# Patient Record
Sex: FEMALE | Race: WHITE | NOT HISPANIC OR LATINO | Employment: OTHER | URBAN - METROPOLITAN AREA
[De-identification: names, ages, dates, MRNs, and addresses within clinical notes are randomized per-mention and may not be internally consistent; named-entity substitution may affect disease eponyms.]

---

## 2023-07-06 ENCOUNTER — HOSPITAL ENCOUNTER (OUTPATIENT)
Facility: HOSPITAL | Age: 48
Discharge: HOME | End: 2023-07-09
Attending: EMERGENCY MEDICINE | Admitting: HOSPITALIST
Payer: COMMERCIAL

## 2023-07-06 DIAGNOSIS — D25.9 UTERINE LEIOMYOMA, UNSPECIFIED LOCATION: ICD-10-CM

## 2023-07-06 DIAGNOSIS — R10.2 PELVIC PAIN: ICD-10-CM

## 2023-07-06 DIAGNOSIS — N10 ACUTE PYELONEPHRITIS: Primary | ICD-10-CM

## 2023-07-06 PROBLEM — N83.209 OVARIAN CYST: Status: ACTIVE | Noted: 2023-07-06

## 2023-07-06 PROBLEM — Z79.4 TYPE 2 DIABETES MELLITUS, WITH LONG-TERM CURRENT USE OF INSULIN: Status: ACTIVE | Noted: 2023-07-06

## 2023-07-06 PROBLEM — N12 PYELONEPHRITIS: Status: ACTIVE | Noted: 2023-07-06

## 2023-07-06 PROBLEM — E11.9 TYPE 2 DIABETES MELLITUS, WITH LONG-TERM CURRENT USE OF INSULIN: Status: ACTIVE | Noted: 2023-07-06

## 2023-07-06 LAB
ALBUMIN SERPL BCP-MCNC: 3.6 G/DL (ref 3.5–5.2)
ALP SERPL-CCNC: 88 U/L (ref 55–135)
ALT SERPL W/O P-5'-P-CCNC: 26 U/L (ref 10–44)
ANION GAP SERPL CALC-SCNC: 8 MMOL/L (ref 8–16)
AST SERPL-CCNC: 17 U/L (ref 10–40)
BACTERIA #/AREA URNS HPF: ABNORMAL /HPF
BASOPHILS # BLD AUTO: 0.04 K/UL (ref 0–0.2)
BASOPHILS NFR BLD: 0.8 % (ref 0–1.9)
BILIRUB SERPL-MCNC: 0.6 MG/DL (ref 0.1–1)
BILIRUB UR QL STRIP: NEGATIVE
BUN SERPL-MCNC: 17 MG/DL (ref 6–20)
CALCIUM SERPL-MCNC: 9.8 MG/DL (ref 8.7–10.5)
CHLORIDE SERPL-SCNC: 99 MMOL/L (ref 95–110)
CLARITY UR: ABNORMAL
CO2 SERPL-SCNC: 28 MMOL/L (ref 23–29)
COLOR UR: ABNORMAL
CREAT SERPL-MCNC: 0.8 MG/DL (ref 0.5–1.4)
DIFFERENTIAL METHOD: NORMAL
EOSINOPHIL # BLD AUTO: 0.1 K/UL (ref 0–0.5)
EOSINOPHIL NFR BLD: 2.6 % (ref 0–8)
EPITH CASTS #/AREA URNS LPF: 0 /LPF
ERYTHROCYTE [DISTWIDTH] IN BLOOD BY AUTOMATED COUNT: 12.7 % (ref 11.5–14.5)
EST. GFR  (NO RACE VARIABLE): >60 ML/MIN/1.73 M^2
GLUCOSE SERPL-MCNC: 264 MG/DL (ref 70–110)
GLUCOSE UR QL STRIP: ABNORMAL
HCT VFR BLD AUTO: 46.8 % (ref 37–48.5)
HGB BLD-MCNC: 15.5 G/DL (ref 12–16)
HGB UR QL STRIP: ABNORMAL
HYALINE CASTS #/AREA URNS LPF: 0 /LPF
IMM GRANULOCYTES # BLD AUTO: 0.02 K/UL (ref 0–0.04)
IMM GRANULOCYTES NFR BLD AUTO: 0.4 % (ref 0–0.5)
KETONES UR QL STRIP: NEGATIVE
LACTATE SERPL-SCNC: 1.5 MMOL/L (ref 0.5–2.2)
LEUKOCYTE ESTERASE UR QL STRIP: NEGATIVE
LIPASE SERPL-CCNC: 28 U/L (ref 4–60)
LYMPHOCYTES # BLD AUTO: 1.8 K/UL (ref 1–4.8)
LYMPHOCYTES NFR BLD: 36.7 % (ref 18–48)
MCH RBC QN AUTO: 28.9 PG (ref 27–31)
MCHC RBC AUTO-ENTMCNC: 33.1 G/DL (ref 32–36)
MCV RBC AUTO: 87 FL (ref 82–98)
MICROSCOPIC COMMENT: ABNORMAL
MONOCYTES # BLD AUTO: 0.4 K/UL (ref 0.3–1)
MONOCYTES NFR BLD: 8 % (ref 4–15)
NEUTROPHILS # BLD AUTO: 2.6 K/UL (ref 1.8–7.7)
NEUTROPHILS NFR BLD: 51.5 % (ref 38–73)
NITRITE UR QL STRIP: NEGATIVE
NRBC BLD-RTO: 0 /100 WBC
PH UR STRIP: 5 [PH] (ref 5–8)
PLATELET # BLD AUTO: 166 K/UL (ref 150–450)
PMV BLD AUTO: 10.4 FL (ref 9.2–12.9)
POCT GLUCOSE: 76 MG/DL (ref 70–110)
POTASSIUM SERPL-SCNC: 4.6 MMOL/L (ref 3.5–5.1)
PROT SERPL-MCNC: 8 G/DL (ref 6–8.4)
PROT UR QL STRIP: ABNORMAL
RBC # BLD AUTO: 5.36 M/UL (ref 4–5.4)
RBC #/AREA URNS HPF: >100 /HPF (ref 0–4)
SODIUM SERPL-SCNC: 135 MMOL/L (ref 136–145)
SP GR UR STRIP: 1.01 (ref 1–1.03)
URN SPEC COLLECT METH UR: ABNORMAL
UROBILINOGEN UR STRIP-ACNC: NEGATIVE EU/DL
WBC # BLD AUTO: 4.98 K/UL (ref 3.9–12.7)
WBC #/AREA URNS HPF: 0 /HPF (ref 0–5)
YEAST URNS QL MICRO: ABNORMAL

## 2023-07-06 PROCEDURE — 25000003 PHARM REV CODE 250: Performed by: EMERGENCY MEDICINE

## 2023-07-06 PROCEDURE — 85025 COMPLETE CBC W/AUTO DIFF WBC: CPT | Performed by: PHYSICIAN ASSISTANT

## 2023-07-06 PROCEDURE — 11000001 HC ACUTE MED/SURG PRIVATE ROOM

## 2023-07-06 PROCEDURE — 87186 SC STD MICRODIL/AGAR DIL: CPT | Mod: 59 | Performed by: NURSE PRACTITIONER

## 2023-07-06 PROCEDURE — 87077 CULTURE AEROBIC IDENTIFY: CPT | Mod: 59 | Performed by: NURSE PRACTITIONER

## 2023-07-06 PROCEDURE — 36415 COLL VENOUS BLD VENIPUNCTURE: CPT | Performed by: EMERGENCY MEDICINE

## 2023-07-06 PROCEDURE — 80053 COMPREHEN METABOLIC PANEL: CPT | Performed by: PHYSICIAN ASSISTANT

## 2023-07-06 PROCEDURE — 63600175 PHARM REV CODE 636 W HCPCS: Performed by: EMERGENCY MEDICINE

## 2023-07-06 PROCEDURE — G0378 HOSPITAL OBSERVATION PER HR: HCPCS

## 2023-07-06 PROCEDURE — 36415 COLL VENOUS BLD VENIPUNCTURE: CPT | Performed by: PHYSICIAN ASSISTANT

## 2023-07-06 PROCEDURE — 83690 ASSAY OF LIPASE: CPT | Performed by: PHYSICIAN ASSISTANT

## 2023-07-06 PROCEDURE — 87086 URINE CULTURE/COLONY COUNT: CPT | Performed by: NURSE PRACTITIONER

## 2023-07-06 PROCEDURE — 25000003 PHARM REV CODE 250: Performed by: NURSE PRACTITIONER

## 2023-07-06 PROCEDURE — 96375 TX/PRO/DX INJ NEW DRUG ADDON: CPT

## 2023-07-06 PROCEDURE — 87040 BLOOD CULTURE FOR BACTERIA: CPT | Performed by: EMERGENCY MEDICINE

## 2023-07-06 PROCEDURE — 96361 HYDRATE IV INFUSION ADD-ON: CPT

## 2023-07-06 PROCEDURE — 99285 EMERGENCY DEPT VISIT HI MDM: CPT | Mod: 25

## 2023-07-06 PROCEDURE — 81000 URINALYSIS NONAUTO W/SCOPE: CPT | Performed by: PHYSICIAN ASSISTANT

## 2023-07-06 PROCEDURE — 96365 THER/PROPH/DIAG IV INF INIT: CPT

## 2023-07-06 PROCEDURE — 83605 ASSAY OF LACTIC ACID: CPT | Performed by: EMERGENCY MEDICINE

## 2023-07-06 RX ORDER — IPRATROPIUM BROMIDE AND ALBUTEROL SULFATE 2.5; .5 MG/3ML; MG/3ML
3 SOLUTION RESPIRATORY (INHALATION) EVERY 4 HOURS PRN
Status: DISCONTINUED | OUTPATIENT
Start: 2023-07-06 | End: 2023-07-09 | Stop reason: HOSPADM

## 2023-07-06 RX ORDER — SODIUM CHLORIDE 9 MG/ML
INJECTION, SOLUTION INTRAVENOUS CONTINUOUS
Status: DISCONTINUED | OUTPATIENT
Start: 2023-07-06 | End: 2023-07-07

## 2023-07-06 RX ORDER — INSULIN ASPART 100 [IU]/ML
1-10 INJECTION, SOLUTION INTRAVENOUS; SUBCUTANEOUS
Status: DISCONTINUED | OUTPATIENT
Start: 2023-07-06 | End: 2023-07-09 | Stop reason: HOSPADM

## 2023-07-06 RX ORDER — LANOLIN ALCOHOL/MO/W.PET/CERES
800 CREAM (GRAM) TOPICAL
Status: DISCONTINUED | OUTPATIENT
Start: 2023-07-06 | End: 2023-07-09 | Stop reason: HOSPADM

## 2023-07-06 RX ORDER — GLUCAGON 1 MG
1 KIT INJECTION
Status: DISCONTINUED | OUTPATIENT
Start: 2023-07-06 | End: 2023-07-09 | Stop reason: HOSPADM

## 2023-07-06 RX ORDER — MAG HYDROX/ALUMINUM HYD/SIMETH 200-200-20
30 SUSPENSION, ORAL (FINAL DOSE FORM) ORAL 4 TIMES DAILY PRN
Status: DISCONTINUED | OUTPATIENT
Start: 2023-07-06 | End: 2023-07-09 | Stop reason: HOSPADM

## 2023-07-06 RX ORDER — ACETAMINOPHEN 325 MG/1
650 TABLET ORAL EVERY 4 HOURS PRN
Status: DISCONTINUED | OUTPATIENT
Start: 2023-07-06 | End: 2023-07-09 | Stop reason: HOSPADM

## 2023-07-06 RX ORDER — SODIUM CHLORIDE 0.9 % (FLUSH) 0.9 %
10 SYRINGE (ML) INJECTION EVERY 8 HOURS PRN
Status: DISCONTINUED | OUTPATIENT
Start: 2023-07-06 | End: 2023-07-09 | Stop reason: HOSPADM

## 2023-07-06 RX ORDER — IBUPROFEN 200 MG
24 TABLET ORAL
Status: DISCONTINUED | OUTPATIENT
Start: 2023-07-06 | End: 2023-07-09 | Stop reason: HOSPADM

## 2023-07-06 RX ORDER — SODIUM,POTASSIUM PHOSPHATES 280-250MG
2 POWDER IN PACKET (EA) ORAL
Status: DISCONTINUED | OUTPATIENT
Start: 2023-07-06 | End: 2023-07-09 | Stop reason: HOSPADM

## 2023-07-06 RX ORDER — ACETAMINOPHEN 325 MG/1
650 TABLET ORAL EVERY 6 HOURS PRN
Status: DISCONTINUED | OUTPATIENT
Start: 2023-07-06 | End: 2023-07-09 | Stop reason: HOSPADM

## 2023-07-06 RX ORDER — ONDANSETRON 2 MG/ML
4 INJECTION INTRAMUSCULAR; INTRAVENOUS EVERY 8 HOURS PRN
Status: DISCONTINUED | OUTPATIENT
Start: 2023-07-06 | End: 2023-07-09 | Stop reason: HOSPADM

## 2023-07-06 RX ORDER — IBUPROFEN 200 MG
16 TABLET ORAL
Status: DISCONTINUED | OUTPATIENT
Start: 2023-07-06 | End: 2023-07-09 | Stop reason: HOSPADM

## 2023-07-06 RX ORDER — NALOXONE HCL 0.4 MG/ML
0.02 VIAL (ML) INJECTION
Status: DISCONTINUED | OUTPATIENT
Start: 2023-07-06 | End: 2023-07-09 | Stop reason: HOSPADM

## 2023-07-06 RX ORDER — TALC
9 POWDER (GRAM) TOPICAL NIGHTLY PRN
Status: DISCONTINUED | OUTPATIENT
Start: 2023-07-06 | End: 2023-07-09 | Stop reason: HOSPADM

## 2023-07-06 RX ORDER — ONDANSETRON 2 MG/ML
4 INJECTION INTRAMUSCULAR; INTRAVENOUS
Status: COMPLETED | OUTPATIENT
Start: 2023-07-06 | End: 2023-07-06

## 2023-07-06 RX ORDER — MORPHINE SULFATE 4 MG/ML
4 INJECTION, SOLUTION INTRAMUSCULAR; INTRAVENOUS EVERY 4 HOURS PRN
Status: DISCONTINUED | OUTPATIENT
Start: 2023-07-06 | End: 2023-07-09 | Stop reason: HOSPADM

## 2023-07-06 RX ORDER — SIMETHICONE 80 MG
1 TABLET,CHEWABLE ORAL 4 TIMES DAILY PRN
Status: DISCONTINUED | OUTPATIENT
Start: 2023-07-06 | End: 2023-07-09 | Stop reason: HOSPADM

## 2023-07-06 RX ORDER — MORPHINE SULFATE 4 MG/ML
4 INJECTION, SOLUTION INTRAMUSCULAR; INTRAVENOUS
Status: COMPLETED | OUTPATIENT
Start: 2023-07-06 | End: 2023-07-06

## 2023-07-06 RX ADMIN — ONDANSETRON HYDROCHLORIDE 4 MG: 2 SOLUTION INTRAMUSCULAR; INTRAVENOUS at 04:07

## 2023-07-06 RX ADMIN — SODIUM CHLORIDE: 9 INJECTION, SOLUTION INTRAVENOUS at 08:07

## 2023-07-06 RX ADMIN — SODIUM CHLORIDE, POTASSIUM CHLORIDE, SODIUM LACTATE AND CALCIUM CHLORIDE 1000 ML: 600; 310; 30; 20 INJECTION, SOLUTION INTRAVENOUS at 04:07

## 2023-07-06 RX ADMIN — CEFTRIAXONE SODIUM 1 G: 1 INJECTION, POWDER, FOR SOLUTION INTRAMUSCULAR; INTRAVENOUS at 06:07

## 2023-07-06 RX ADMIN — MORPHINE SULFATE 4 MG: 4 INJECTION, SOLUTION INTRAMUSCULAR; INTRAVENOUS at 04:07

## 2023-07-06 NOTE — ED NOTES
Pt worried since she hadn't had a period since December and while obtaining a urine specimen she felt a gush and passed a clot and blood. M.D. is aware and at the bedside.

## 2023-07-06 NOTE — FIRST PROVIDER EVALUATION
" Emergency Department TeleTriage Encounter Note      CHIEF COMPLAINT    Chief Complaint   Patient presents with    Abdominal Pain     Lower abdominal pain and pressure, doesn't burn when she urinated but has pain after and back pain        VITAL SIGNS   Initial Vitals [07/06/23 1508]   BP Pulse Resp Temp SpO2   (!) 190/85 85 20 98.3 °F (36.8 °C) 97 %      MAP       --            ALLERGIES    Review of patient's allergies indicates:  No Known Allergies    PROVIDER TRIAGE NOTE  This is a teletriage evaluation of a 48 y.o. female presenting to the ED complaining of abdominal pain. Patient with lower abdominal pain and pressure for 5 days, but has worsened in the last 24 hours. She is currently taking ciprofloxacin for UTI. She had CT done 3 days ago which showed "all kinds of stuff." She has results on her phone. She reports nausea and vomiting.    Patient is alert and oriented. She speaks in complete sentences. She is sitting upright in the chair in no distress. She appears uncomfortable.    Initial orders will be placed and care will be transferred to an alternate provider when patient is roomed for a full evaluation. Any additional orders and the final disposition will be determined by that provider.         ORDERS  Labs Reviewed   CBC W/ AUTO DIFFERENTIAL   COMPREHENSIVE METABOLIC PANEL   LIPASE   URINALYSIS, REFLEX TO URINE CULTURE       ED Orders (720h ago, onward)      Start Ordered     Status Ordering Provider    07/06/23 1524 07/06/23 1523  Vital signs  Every 2 hours         Ordered DEBI BONILLA    07/06/23 1524 07/06/23 1523  Diet NPO  Diet effective now         Ordered DEBI BONILLA    07/06/23 1524 07/06/23 1523  Insert peripheral IV  Once         Ordered DEBI BONILLA    07/06/23 1524 07/06/23 1523  CBC W/ AUTO DIFFERENTIAL  STAT         Pending Collection DEBI BONILLA    07/06/23 1524 07/06/23 1523  Comp. Metabolic Panel  STAT         Pending Collection DEBI BONILLA    07/06/23 1524 07/06/23 " 1523  Lipase  STAT         Pending Collection DEBI BONILLA    07/06/23 1524 07/06/23 1523  Urinalysis, Reflex to Urine Culture Urine, Clean Catch  STAT         Ordered DEBI BONILLA              Virtual Visit Note: The provider triage portion of this emergency department evaluation and documentation was performed via e-volo, a HIPAA-compliant telemedicine application, in concert with a tele-presenter in the room. A face to face patient evaluation with one of my colleagues will occur once the patient is placed in an emergency department room.      DISCLAIMER: This note was prepared with [employer] voice recognition transcription software. Garbled syntax, mangled pronouns, and other bizarre constructions may be attributed to that software system.

## 2023-07-07 ENCOUNTER — TELEPHONE (OUTPATIENT)
Dept: OBSTETRICS AND GYNECOLOGY | Facility: CLINIC | Age: 48
End: 2023-07-07

## 2023-07-07 PROBLEM — E44.1 MILD MALNUTRITION: Status: ACTIVE | Noted: 2023-07-07

## 2023-07-07 LAB
ALBUMIN SERPL BCP-MCNC: 3 G/DL (ref 3.5–5.2)
ALP SERPL-CCNC: 74 U/L (ref 55–135)
ALT SERPL W/O P-5'-P-CCNC: 18 U/L (ref 10–44)
ANION GAP SERPL CALC-SCNC: 9 MMOL/L (ref 8–16)
AST SERPL-CCNC: 15 U/L (ref 10–40)
BASOPHILS # BLD AUTO: 0.04 K/UL (ref 0–0.2)
BASOPHILS NFR BLD: 0.7 % (ref 0–1.9)
BILIRUB SERPL-MCNC: 0.4 MG/DL (ref 0.1–1)
BUN SERPL-MCNC: 14 MG/DL (ref 6–20)
CALCIUM SERPL-MCNC: 8.8 MG/DL (ref 8.7–10.5)
CHLORIDE SERPL-SCNC: 102 MMOL/L (ref 95–110)
CO2 SERPL-SCNC: 27 MMOL/L (ref 23–29)
CREAT SERPL-MCNC: 0.8 MG/DL (ref 0.5–1.4)
DIFFERENTIAL METHOD: ABNORMAL
EOSINOPHIL # BLD AUTO: 0.2 K/UL (ref 0–0.5)
EOSINOPHIL NFR BLD: 2.8 % (ref 0–8)
ERYTHROCYTE [DISTWIDTH] IN BLOOD BY AUTOMATED COUNT: 12.8 % (ref 11.5–14.5)
EST. GFR  (NO RACE VARIABLE): >60 ML/MIN/1.73 M^2
GLUCOSE SERPL-MCNC: 150 MG/DL (ref 70–110)
HCT VFR BLD AUTO: 43 % (ref 37–48.5)
HGB BLD-MCNC: 14 G/DL (ref 12–16)
IMM GRANULOCYTES # BLD AUTO: 0.04 K/UL (ref 0–0.04)
IMM GRANULOCYTES NFR BLD AUTO: 0.7 % (ref 0–0.5)
LYMPHOCYTES # BLD AUTO: 2.6 K/UL (ref 1–4.8)
LYMPHOCYTES NFR BLD: 43.9 % (ref 18–48)
MAGNESIUM SERPL-MCNC: 1.7 MG/DL (ref 1.6–2.6)
MCH RBC QN AUTO: 28.5 PG (ref 27–31)
MCHC RBC AUTO-ENTMCNC: 32.6 G/DL (ref 32–36)
MCV RBC AUTO: 87 FL (ref 82–98)
MONOCYTES # BLD AUTO: 0.5 K/UL (ref 0.3–1)
MONOCYTES NFR BLD: 7.9 % (ref 4–15)
NEUTROPHILS # BLD AUTO: 2.6 K/UL (ref 1.8–7.7)
NEUTROPHILS NFR BLD: 44 % (ref 38–73)
NRBC BLD-RTO: 0 /100 WBC
PHOSPHATE SERPL-MCNC: 4.3 MG/DL (ref 2.7–4.5)
PLATELET # BLD AUTO: 145 K/UL (ref 150–450)
PMV BLD AUTO: 10.1 FL (ref 9.2–12.9)
POCT GLUCOSE: 157 MG/DL (ref 70–110)
POCT GLUCOSE: 158 MG/DL (ref 70–110)
POCT GLUCOSE: 284 MG/DL (ref 70–110)
POTASSIUM SERPL-SCNC: 4.4 MMOL/L (ref 3.5–5.1)
PROT SERPL-MCNC: 6.7 G/DL (ref 6–8.4)
RBC # BLD AUTO: 4.92 M/UL (ref 4–5.4)
SODIUM SERPL-SCNC: 138 MMOL/L (ref 136–145)
WBC # BLD AUTO: 5.97 K/UL (ref 3.9–12.7)

## 2023-07-07 PROCEDURE — 83735 ASSAY OF MAGNESIUM: CPT | Performed by: NURSE PRACTITIONER

## 2023-07-07 PROCEDURE — 36415 COLL VENOUS BLD VENIPUNCTURE: CPT | Performed by: NURSE PRACTITIONER

## 2023-07-07 PROCEDURE — 96375 TX/PRO/DX INJ NEW DRUG ADDON: CPT

## 2023-07-07 PROCEDURE — 96372 THER/PROPH/DIAG INJ SC/IM: CPT | Performed by: NURSE PRACTITIONER

## 2023-07-07 PROCEDURE — 96376 TX/PRO/DX INJ SAME DRUG ADON: CPT

## 2023-07-07 PROCEDURE — 99900035 HC TECH TIME PER 15 MIN (STAT)

## 2023-07-07 PROCEDURE — 80053 COMPREHEN METABOLIC PANEL: CPT | Performed by: NURSE PRACTITIONER

## 2023-07-07 PROCEDURE — 84100 ASSAY OF PHOSPHORUS: CPT | Performed by: NURSE PRACTITIONER

## 2023-07-07 PROCEDURE — 63600175 PHARM REV CODE 636 W HCPCS: Performed by: NURSE PRACTITIONER

## 2023-07-07 PROCEDURE — G0378 HOSPITAL OBSERVATION PER HR: HCPCS

## 2023-07-07 PROCEDURE — 94761 N-INVAS EAR/PLS OXIMETRY MLT: CPT

## 2023-07-07 PROCEDURE — 85025 COMPLETE CBC W/AUTO DIFF WBC: CPT | Performed by: NURSE PRACTITIONER

## 2023-07-07 PROCEDURE — 25000003 PHARM REV CODE 250: Performed by: NURSE PRACTITIONER

## 2023-07-07 RX ORDER — KETOROLAC TROMETHAMINE 30 MG/ML
15 INJECTION, SOLUTION INTRAMUSCULAR; INTRAVENOUS EVERY 6 HOURS
Status: DISCONTINUED | OUTPATIENT
Start: 2023-07-07 | End: 2023-07-09 | Stop reason: HOSPADM

## 2023-07-07 RX ADMIN — KETOROLAC TROMETHAMINE 15 MG: 30 INJECTION, SOLUTION INTRAMUSCULAR; INTRAVENOUS at 05:07

## 2023-07-07 RX ADMIN — INSULIN ASPART 2 UNITS: 100 INJECTION, SOLUTION INTRAVENOUS; SUBCUTANEOUS at 05:07

## 2023-07-07 RX ADMIN — INSULIN ASPART 3 UNITS: 100 INJECTION, SOLUTION INTRAVENOUS; SUBCUTANEOUS at 09:07

## 2023-07-07 RX ADMIN — CEFTRIAXONE 1 G: 1 INJECTION, POWDER, FOR SOLUTION INTRAMUSCULAR; INTRAVENOUS at 06:07

## 2023-07-07 RX ADMIN — CEFTRIAXONE 1 G: 1 INJECTION, POWDER, FOR SOLUTION INTRAMUSCULAR; INTRAVENOUS at 05:07

## 2023-07-07 RX ADMIN — ACETAMINOPHEN 650 MG: 325 TABLET ORAL at 12:07

## 2023-07-07 RX ADMIN — MORPHINE SULFATE 4 MG: 4 INJECTION INTRAVENOUS at 02:07

## 2023-07-07 RX ADMIN — KETOROLAC TROMETHAMINE 15 MG: 30 INJECTION, SOLUTION INTRAMUSCULAR; INTRAVENOUS at 11:07

## 2023-07-07 NOTE — SUBJECTIVE & OBJECTIVE
Interval History:  Patient seen and examined.  No acute events since admission.  Prefers to take nonnarcotic medications for her pain.  Discussed trialing Toradol.  Patient agreeable.  Awaiting evaluation by gyn.  Reports that she had to episodes of vaginal clotting today.    Review of Systems   Constitutional:  Positive for appetite change, fatigue and fever.   Genitourinary:  Positive for frequency and vaginal bleeding.        Suprapubic pressure   Musculoskeletal:  Positive for back pain.   All other systems reviewed and are negative.  Objective:     Vital Signs (Most Recent):  Temp: 97.3 °F (36.3 °C) (07/07/23 0723)  Pulse: 71 (07/07/23 0723)  Resp: 18 (07/07/23 0723)  BP: 112/61 (07/07/23 0723)  SpO2: 96 % (07/07/23 0741) Vital Signs (24h Range):  Temp:  [97 °F (36.1 °C)-98.9 °F (37.2 °C)] 97.3 °F (36.3 °C)  Pulse:  [71-85] 71  Resp:  [16-20] 18  SpO2:  [92 %-100 %] 96 %  BP: (112-190)/(61-85) 112/61     Weight: 85.2 kg (187 lb 13.3 oz)  Body mass index is 34.35 kg/m².    Intake/Output Summary (Last 24 hours) at 7/7/2023 1106  Last data filed at 7/7/2023 0513  Gross per 24 hour   Intake 1068.77 ml   Output 900 ml   Net 168.77 ml         Physical Exam  Constitutional:       Appearance: She is ill-appearing.   Cardiovascular:      Rate and Rhythm: Normal rate and regular rhythm.      Pulses: Normal pulses.      Heart sounds: Normal heart sounds.   Pulmonary:      Effort: Pulmonary effort is normal. No respiratory distress.      Breath sounds: Normal breath sounds.   Abdominal:      General: Bowel sounds are normal. There is no distension.      Palpations: Abdomen is soft.      Tenderness: There is abdominal tenderness (Suprapubic). There is no guarding.   Skin:     General: Skin is warm and dry.      Coloration: Skin is not pale.   Neurological:      Mental Status: She is alert.           Significant Labs: All pertinent labs within the past 24 hours have been reviewed.  CBC:   Recent Labs   Lab 07/06/23  4126  07/07/23  0356   WBC 4.98 5.97   HGB 15.5 14.0   HCT 46.8 43.0    145*     CMP:   Recent Labs   Lab 07/06/23  1546 07/07/23  0356   * 138   K 4.6 4.4   CL 99 102   CO2 28 27   * 150*   BUN 17 14   CREATININE 0.8 0.8   CALCIUM 9.8 8.8   PROT 8.0 6.7   ALBUMIN 3.6 3.0*   BILITOT 0.6 0.4   ALKPHOS 88 74   AST 17 15   ALT 26 18   ANIONGAP 8 9     Urine Studies:   Recent Labs   Lab 07/06/23  1737   COLORU Red*   APPEARANCEUA Cloudy*   PHUR 5.0   SPECGRAV 1.010   PROTEINUA 3+*   GLUCUA 3+*   KETONESU Negative   BILIRUBINUA Negative   OCCULTUA 3+*   NITRITE Negative   UROBILINOGEN Negative   LEUKOCYTESUR Negative   RBCUA >100*   WBCUA 0   BACTERIA Rare   HYALINECASTS 0       Significant Imaging: I have reviewed all pertinent imaging results/findings within the past 24 hours.

## 2023-07-07 NOTE — PLAN OF CARE
[facility] - Med/Surg  Initial Discharge Assessment       Primary Care Provider: Yuliet Holman NP    Admission Diagnosis: Acute pyelonephritis [N10]    Admission Date: 7/6/2023  Expected Discharge Date: 7/8/2023    Transition of Care Barriers: None    Payor: [employer] ACCOUNT / Plan: AMBETTER / Product Type: PPO /     Extended Emergency Contact Information  Primary Emergency Contact: Hasmukh Cameron  Mobile Phone: [phone]  Relation: Spouse  Preferred language: English   needed? No    Discharge Plan A: Home  Discharge Plan B: Home with family      [facility] - [place] - [address] [facility]  [address] [place] [address]  Phone: [phone] Fax: [phone]    W met with patient and patient's spouse Hasmukh Cameron at bedside to complete discharge planning assessment.  Patient alert and oriented xs 4.  Patient verified all demographic information on facesheet is correct.  Patient verified PCP is NIKUNJ Holman.  Patient verified primary health insurance is meevl.  Patient with NO home health or DME.  Patient with NO POA or Living Will.  Patient not on dialysis or medication coumadin.  Patient with no 30 day admission.  Patient with no financial issues at this time.  Patient family will provide transportation upon discharge from facility.  Patient independent with ADLs, live with spouse, drives self.      Initial Assessment (most recent)       Adult Discharge Assessment - 07/07/23 1312          Discharge Assessment    Assessment Type Discharge Planning Assessment     Confirmed/corrected address, phone number and insurance Yes     Confirmed Demographics Correct on Facesheet     Source of Information patient     Does patient/caregiver understand observation status Yes     Communicated KARINA with patient/caregiver Yes     People in Home spouse     Facility Arrived From: home     Do you expect to return to your current living situation? Yes     Do you have help at home  or someone to help you manage your care at home? Yes     Who are your caregiver(s) and their phone number(s)? spouse     Prior to hospitilization cognitive status: Alert/Oriented     Current cognitive status: Alert/Oriented     Equipment Currently Used at Home none     Readmission within 30 days? No     Patient currently being followed by outpatient case management? No     Do you currently have service(s) that help you manage your care at home? No     Do you take prescription medications? Yes     Do you have prescription coverage? Yes     Do you have any problems affording any of your prescribed medications? No     Is the patient taking medications as prescribed? yes     Who is going to help you get home at discharge? spouse     How do you get to doctors appointments? car, drives self     Are you on dialysis? No     Do you take coumadin? No     Discharge Plan A Home     Discharge Plan B Home with family     DME Needed Upon Discharge  none     Discharge Plan discussed with: Patient;Spouse/sig other     Transition of Care Barriers None        Physical Activity    On average, how many days per week do you engage in moderate to strenuous exercise (like a brisk walk)? Patient refused     On average, how many minutes do you engage in exercise at this level? Patient refused        Financial Resource Strain    How hard is it for you to pay for the very basics like food, housing, medical care, and heating? Patient refused        Housing Stability    In the last 12 months, was there a time when you were not able to pay the mortgage or rent on time? Patient refused     In the last 12 months, was there a time when you did not have a steady place to sleep or slept in a shelter (including now)? Patient refused        Transportation Needs    In the past 12 months, has lack of transportation kept you from medical appointments or from getting medications? Patient refused     In the past 12 months, has lack of transportation kept you  from meetings, work, or from getting things needed for daily living? Patient refused        Food Insecurity    Within the past 12 months, you worried that your food would run out before you got the money to buy more. Patient refused     Within the past 12 months, the food you bought just didn't last and you didn't have money to get more. Patient refused        Stress    Do you feel stress - tense, restless, nervous, or anxious, or unable to sleep at night because your mind is troubled all the time - these days? Patient refused        Social Connections    In a typical week, how many times do you talk on the phone with family, friends, or neighbors? Patient refused     How often do you get together with friends or relatives? Patient refused     How often do you attend Druze or Scientologist services? Patient refused     Do you belong to any clubs or organizations such as Druze groups, unions, fraternal or athletic groups, or school groups? Patient refused     How often do you attend meetings of the clubs or organizations you belong to? Patient refused     Are you , , , , never , or living with a partner?         Alcohol Use    Q1: How often do you have a drink containing alcohol? Patient refused     Q2: How many drinks containing alcohol do you have on a typical day when you are drinking? Patient refused     Q3: How often do you have six or more drinks on one occasion? Patient refused

## 2023-07-07 NOTE — PROGRESS NOTES
[facility] Medicine  Progress Note    Patient Name: Boo Cameron  MRN: [id number]  Patient Class: OP- Observation   Admission Date: 7/6/2023  Length of Stay: 1 days  Attending Physician: William Narayan MD  Primary Care Provider: No primary care provider on file.        Subjective:     Principal Problem:Pyelonephritis        HPI:  Baljeet Cameron is a 48 year female who presents emergency room for evaluation of fever, nausea, abdominal pain, and flank pain.  She reports the symptoms onset approximately 4 weeks ago.  Four weeks ago she was seen by primary care and diagnosed with urinary tract infection.  She was treated with amoxicillin.  A week later she still had symptoms returned to her PCP and was started on Macrobid.  Approximately 1 week later symptoms have not improved and patient returned to PCP and was started on Bactrim DS.  She then returned to PCP for worsening of symptoms where she had an outpatient CT ordered and was started on p.o. Cipro which she is taking as directed.  Outpatient CT demonstrated a large ovarian/bladder cyst some focal pyelonephritis.  She was seen once by OBGYN but has yet to receive any follow-up.  Patient reports she did not have a urine culture ordered.  Just prior to arrival patient reported going to the restroom to attempt to void when she noticed a large blood clot in her urine.  Previous medical history includes diabetes.  She reports her glucose has been stable.  She reports compliance with her diabetic medications.  CBC unremarkable.  CMP with glucose of 264 otherwise unremarkable.  Urinalysis with rare bacteria greater than 100 red blood cells.  Patient admitted to Hospital Medicine for treatment and management.  Patient will be started on IV fluids and given Rocephin.  Urine culture has been ordered.      Overview/Hospital Course:  No notes on file    Interval History:  Patient seen and examined.  No acute events since admission.  Prefers to take  nonnarcotic medications for her pain.  Discussed trialing Toradol.  Patient agreeable.  Awaiting evaluation by gyn.  Reports that she had to episodes of vaginal clotting today.    Review of Systems   Constitutional:  Positive for appetite change, fatigue and fever.   Genitourinary:  Positive for frequency and vaginal bleeding.        Suprapubic pressure   Musculoskeletal:  Positive for back pain.   All other systems reviewed and are negative.  Objective:     Vital Signs (Most Recent):  Temp: 97.3 °F (36.3 °C) (07/07/23 0723)  Pulse: 71 (07/07/23 0723)  Resp: 18 (07/07/23 0723)  BP: 112/61 (07/07/23 0723)  SpO2: 96 % (07/07/23 0741) Vital Signs (24h Range):  Temp:  [97 °F (36.1 °C)-98.9 °F (37.2 °C)] 97.3 °F (36.3 °C)  Pulse:  [71-85] 71  Resp:  [16-20] 18  SpO2:  [92 %-100 %] 96 %  BP: (112-190)/(61-85) 112/61     Weight: 85.2 kg (187 lb 13.3 oz)  Body mass index is 34.35 kg/m².    Intake/Output Summary (Last 24 hours) at 7/7/2023 1106  Last data filed at 7/7/2023 0513  Gross per 24 hour   Intake 1068.77 ml   Output 900 ml   Net 168.77 ml         Physical Exam  Constitutional:       Appearance: She is ill-appearing.   Cardiovascular:      Rate and Rhythm: Normal rate and regular rhythm.      Pulses: Normal pulses.      Heart sounds: Normal heart sounds.   Pulmonary:      Effort: Pulmonary effort is normal. No respiratory distress.      Breath sounds: Normal breath sounds.   Abdominal:      General: Bowel sounds are normal. There is no distension.      Palpations: Abdomen is soft.      Tenderness: There is abdominal tenderness (Suprapubic). There is no guarding.   Skin:     General: Skin is warm and dry.      Coloration: Skin is not pale.   Neurological:      Mental Status: She is alert.           Significant Labs: All pertinent labs within the past 24 hours have been reviewed.  CBC:   Recent Labs   Lab 07/06/23  1546 07/07/23  0356   WBC 4.98 5.97   HGB 15.5 14.0   HCT 46.8 43.0    145*     CMP:   Recent Labs    Lab 07/06/23  1546 07/07/23  0356   * 138   K 4.6 4.4   CL 99 102   CO2 28 27   * 150*   BUN 17 14   CREATININE 0.8 0.8   CALCIUM 9.8 8.8   PROT 8.0 6.7   ALBUMIN 3.6 3.0*   BILITOT 0.6 0.4   ALKPHOS 88 74   AST 17 15   ALT 26 18   ANIONGAP 8 9     Urine Studies:   Recent Labs   Lab 07/06/23  1737   COLORU Red*   APPEARANCEUA Cloudy*   PHUR 5.0   SPECGRAV 1.010   PROTEINUA 3+*   GLUCUA 3+*   KETONESU Negative   BILIRUBINUA Negative   OCCULTUA 3+*   NITRITE Negative   UROBILINOGEN Negative   LEUKOCYTESUR Negative   RBCUA >100*   WBCUA 0   BACTERIA Rare   HYALINECASTS 0       Significant Imaging: I have reviewed all pertinent imaging results/findings within the past 24 hours.      Assessment/Plan:      * Pyelonephritis  Acute problem   Urine culture pending   Blood cultures pending   IV fluids   Rocephin 1 gram q.12 IV   Follow urine culture and adjust antibiotics accordingly      Ovarian cyst  Acute problem   Gyn consulted by ER physician    Type 2 diabetes mellitus, with long-term current use of insulin  Patient's FSGs are uncontrolled due to hyperglycemia on current medication regimen.  Last A1c reviewed- No results found for: LABA1C, HGBA1C  Most recent fingerstick glucose reviewed-   Recent Labs   Lab 07/06/23  2152   POCTGLUCOSE 76     Current correctional scale  Medium  Maintain anti-hyperglycemic dose as follows-   Antihyperglycemics (From admission, onward)    Start     Stop Route Frequency Ordered    07/06/23 2029  insulin aspart U-100 pen 1-10 Units         -- SubQ Before meals & nightly PRN 07/06/23 2021        Hold Oral hypoglycemics while patient is in the hospital.      VTE Risk Mitigation (From admission, onward)         Ordered     IP VTE HIGH RISK PATIENT  Once         07/06/23 2021     Place sequential compression device  Until discontinued         07/06/23 2021     Place RA hose  Until discontinued         07/06/23 2021                Discharge Planning   KARINA:      Code Status: Full  Code   Is the patient medically ready for discharge?:     Reason for patient still in hospital (select all that apply): Patient trending condition, Laboratory test, Treatment and Consult recommendations                     Rebecca Bowden NP  Department of Hospital Medicine   [facility]/Surg

## 2023-07-07 NOTE — PLAN OF CARE
Recommendations  1) Advance PO diet to goal of DM 1500 kcal   2) Antiemetics as needed   3) Add Boost glucose control BID   4) weigh weekly   5) Nutrition education verbally reviewed     Goals: 1) PO diet advanced in < 4 days  Nutrition Goal Status: new  Communication of RD Recs:  (POC, sticky note)

## 2023-07-07 NOTE — ASSESSMENT & PLAN NOTE
Acute problem   Urine culture pending   Blood cultures pending   IV fluids   Rocephin 1 gram q.12 IV   Follow urine culture and adjust antibiotics accordingly

## 2023-07-07 NOTE — HPI
Baljeet Cameron is a 48 year female who presents emergency room for evaluation of fever, nausea, abdominal pain, and flank pain.  She reports the symptoms onset approximately 4 weeks ago.  Four weeks ago she was seen by primary care and diagnosed with urinary tract infection.  She was treated with amoxicillin.  A week later she still had symptoms returned to her PCP and was started on Macrobid.  Approximately 1 week later symptoms have not improved and patient returned to PCP and was started on Bactrim DS.  She then returned to PCP for worsening of symptoms where she had an outpatient CT ordered and was started on p.o. Cipro which she is taking as directed.  Outpatient CT demonstrated a large ovarian/bladder cyst some focal pyelonephritis.  She was seen once by OBGYN but has yet to receive any follow-up.  Patient reports she did not have a urine culture ordered.  Just prior to arrival patient reported going to the restroom to attempt to void when she noticed a large blood clot in her urine.  Previous medical history includes diabetes.  She reports her glucose has been stable.  She reports compliance with her diabetic medications.  CBC unremarkable.  CMP with glucose of 264 otherwise unremarkable.  Urinalysis with rare bacteria greater than 100 red blood cells.  Patient admitted to Hospital Medicine for treatment and management.  Patient will be started on IV fluids and given Rocephin.  Urine culture has been ordered.

## 2023-07-07 NOTE — CONSULTS
[facility]/Surg  Adult Nutrition  Consult Note    SUMMARY     Recommendations  1) Advance PO diet to goal of DM 1500 kcal   2) Antiemetics as needed   3) Add Boost glucose control BID   4) weigh weekly   5) Nutrition education verbally reviewed    Goals: 1) PO diet advanced in < 4 days  Nutrition Goal Status: new  Communication of RD Recs:  (POC, sticky note)    Assessment and Plan    Mild acute illness related malnutrition  R/t UTI, N/V, pain  And decreased appetite  As evidenced by PO intakes < 50% of needs x 1 week ( < 75% x 3-4 weeks)  Intervention: collaboration with other providers  1) Advance PO diet to goal of DM 1500 kcal   2) Antiemetics as needed   3) Add Boost glucose control BID   4) weigh weekly   5) Nutrition education verbally reviewed  new     Malnutrition Assessment     Skin (Micronutrient):  (Alhaji = 23)   Micronutrient Evaluation Summary: no deficiencies   Energy Intake (Malnutrition): < 50% of needs x 1 week (less than 75% for 3-4 weeks)                        Reason for Assessment    Reason For Assessment: consult  Diagnosis:  (pyelonephritis)  Relevant Medical History: DM2, ovarian cyst  Interdisciplinary Rounds: attended    General Information Comments: 47 y/o female with pyelonephritis s/p 4 weeks of N/V intermittent, abd. pain and fever-was on antibiotics outpatient with no improvement. Pt says he appetite has been poor especially in the last week. Today she is in less pain but NPO.  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 Discussed changes to diet ex: adding bedtimes snack/skipping caffeine. NFPE WDL 7/7/23. No significant wt loss per chart review.    Nutrition Discharge Planning: To be determined- DM 1500 kcal diet    Nutrition Risk Screen    Nutrition Risk Screen: no indicators present    Nutrition/Diet History    Patient Reported  "Diet/Restrictions/Preferences: other (see comments)  Typical Food/Fluid Intake: low carb diet- tries to avoid carbs mostly eats meat and vegetables  Spiritual, Cultural Beliefs, Amish Practices, Values that Affect Care: no  Food Allergies: NKFA  Factors Affecting Nutritional Intake: abdominal pain, nausea/vomiting, NPO    Anthropometrics    Temp: 97.3 °F (36.3 °C)  Height Method: Stated  Height: 5' 2" (157.5 cm)  Height (inches): 62 in  Weight Method: Bed Scale  Weight: 85.2 kg (187 lb 13.3 oz)  Weight (lb): 187.83 lb  Ideal Body Weight (IBW), Female: 110 lb  % Ideal Body Weight, Female (lb): 170.75 %  BMI (Calculated): 34.3  BMI Grade: 30 - 34.9- obesity - grade I        Lab/Procedures/Meds    Pertinent Labs Reviewed: reviewed  BMP  Lab Results   Component Value Date     07/07/2023    K 4.4 07/07/2023     07/07/2023    CO2 27 07/07/2023    BUN 14 07/07/2023    CREATININE 0.8 07/07/2023    CALCIUM 8.8 07/07/2023    ANIONGAP 9 07/07/2023    EGFRNORACEVR >60 07/07/2023     Lab Results   Component Value Date    ALBUMIN 3.0 (L) 07/07/2023     Recent Labs   Lab 07/07/23  1125   POCTGLUCOSE 157*     No results found for: LABA1C, HGBA1C    Pertinent Medications Reviewed: reviewed  Pertinent Medications Comments: NS @ 75 ml/hr, insulin, KNaPhos, Mg oxide, zofran, Kbicarb    Estimated/Assessed Needs    Weight Used For Calorie Calculations: 85.2 kg (187 lb 13.3 oz)  Energy Calorie Requirements (kcal): MSJ( x 1.2) = 1715 kcal  Energy Need Method: [place]  Protein Requirements: 0.8-1 g protein/kg ( 68-85 g)  Weight Used For Protein Calculations: 85.2 kg (187 lb 13.3 oz)  Fluid Requirements (mL): 1700 ml or per MD  Estimated Fluid Requirement Method: RDA Method  CHO Requirement: N/A      Nutrition Prescription Ordered    Current Diet Order: NPO x 1 day  Nutrition Order Comments: last meal wasday before yesterday- toast in the morning and toast with boiled eggs at night    Evaluation of Received " Nutrient/Fluid Intake    Energy Calories Required: not meeting needs  Protein Required: not meeting needs  Fluid Required: not meeting needs  Tolerance: other (see comments) (NPO)     Intake/Output Summary (Last 24 hours) at 7/7/2023 1521  Last data filed at 7/7/2023 0513  Gross per 24 hour   Intake 1068.77 ml   Output 900 ml   Net 168.77 ml       % Intake of Estimated Energy Needs: 0%  % Meal Intake: NPO    Nutrition Risk    Level of Risk/Frequency of Follow-up:  (2 x weekly)       Monitor and Evaluation    Food and Nutrient Intake: energy intake  Food and Nutrient Adminstration: diet order  Knowledge/Beliefs/Attitudes: food and nutrition knowledge/skill  Anthropometric Measurements: weight  Biochemical Data, Medical Tests and Procedures: electrolyte and renal panel, gastrointestinal profile, glucose/endocrine profile  Nutrition-Focused Physical Findings: overall appearance       Nutrition Follow-Up    RD Follow-up?: Yes

## 2023-07-07 NOTE — ASSESSMENT & PLAN NOTE
Mild acute illness related malnutrition  R/t UTI, N/V, pain  And decreased appetite  As evidenced by PO intakes < 50% of needs x 1 week ( < 75% x 3-4 weeks)  Intervention: collaboration with other providers  1) Advance PO diet to goal of DM 1500 kcal   2) Antiemetics as needed   3) Add Boost glucose control BID   4) weigh weekly   5) Nutrition education verbally reviewed  new

## 2023-07-07 NOTE — TELEPHONE ENCOUNTER
----- Message from Paulette Ryan sent at 7/7/2023  3:06 PM CDT -----  Type:  Patient Call    Who Called: Hospital rep ( SRIKANTH)   Additional Information:  Ms. Gonzalez called  regarding  a  consult  for this  pt ,  please  return  call  [phone]/  [phone]

## 2023-07-07 NOTE — NURSING
Awake alert and oriented x4. Follows simple commands. 20g to lt a/c with site free of s/s of infiltration. Questions answered and encouraged.

## 2023-07-07 NOTE — H&P
[facility] Medicine  History & Physical    Patient Name: Baljeet Cameron  MRN: [id number]  Patient Class: IP- Inpatient  Admission Date: 7/6/2023  Attending Physician: William Narayan MD   Primary Care Provider: No primary care provider on file.         Patient information was obtained from patient, past medical records and ER records.     Subjective:     Principal Problem:Pyelonephritis    Chief Complaint:   Chief Complaint   Patient presents with    Abdominal Pain     Lower abdominal pain and pressure, doesn't burn when she urinated but has pain after and back pain         HPI: Baljeet Cameron is a 48 year female who presents emergency room for evaluation of fever, nausea, abdominal pain, and flank pain.  She reports the symptoms onset approximately 4 weeks ago.  Four weeks ago she was seen by primary care and diagnosed with urinary tract infection.  She was treated with amoxicillin.  A week later she still had symptoms returned to her PCP and was started on Macrobid.  Approximately 1 week later symptoms have not improved and patient returned to PCP and was started on Bactrim DS.  She then returned to PCP for worsening of symptoms where she had an outpatient CT ordered and was started on p.o. Cipro which she is taking as directed.  Outpatient CT demonstrated a large ovarian/bladder cyst some focal pyelonephritis.  She was seen once by OBGYN but has yet to receive any follow-up.  Patient reports she did not have a urine culture ordered.  Just prior to arrival patient reported going to the restroom to attempt to void when she noticed a large blood clot in her urine.  Previous medical history includes diabetes.  She reports her glucose has been stable.  She reports compliance with her diabetic medications.  CBC unremarkable.  CMP with glucose of 264 otherwise unremarkable.  Urinalysis with rare bacteria greater than 100 red blood cells.  Patient admitted to Hospital Medicine for treatment and  management.  Patient will be started on IV fluids and given Rocephin.  Urine culture has been ordered.      Past Medical History:   Diagnosis Date    Diabetes mellitus        History reviewed. No pertinent surgical history.    Review of patient's allergies indicates:  No Known Allergies    No current facility-administered medications on file prior to encounter.     No current outpatient medications on file prior to encounter.     Family History       Family history is unknown by patient.          Tobacco Use    Smoking status: Never    Smokeless tobacco: Never   Substance and Sexual Activity    Alcohol use: Not Currently    Drug use: Never    Sexual activity: Not on file     Review of Systems   Constitutional:  Positive for activity change, appetite change and fever. Negative for chills and diaphoresis.   HENT:  Negative for congestion, nosebleeds and tinnitus.    Eyes:  Negative for photophobia and visual disturbance.   Respiratory:  Negative for cough, chest tightness, shortness of breath and wheezing.    Cardiovascular:  Negative for chest pain, palpitations and leg swelling.   Gastrointestinal:  Positive for abdominal pain and nausea. Negative for abdominal distention, constipation, diarrhea and vomiting.   Endocrine: Negative for cold intolerance and heat intolerance.   Genitourinary:  Positive for difficulty urinating, dysuria, flank pain and frequency. Negative for hematuria and urgency.   Musculoskeletal:  Negative for arthralgias, back pain and myalgias.   Skin:  Negative for pallor, rash and wound.   Allergic/Immunologic: Negative for immunocompromised state.   Neurological:  Negative for dizziness, tremors, facial asymmetry, speech difficulty and weakness.   Hematological:  Negative for adenopathy. Does not bruise/bleed easily.   Psychiatric/Behavioral:  Negative for confusion and sleep disturbance. The patient is not nervous/anxious.    Objective:     Vital Signs (Most Recent):  Temp: 97 °F (36.1  °C) (07/06/23 2046)  Pulse: 76 (07/06/23 2046)  Resp: 18 (07/06/23 2046)  BP: (!) 141/65 (07/06/23 2046)  SpO2: (!) 94 % (07/06/23 2046) Vital Signs (24h Range):  Temp:  [97 °F (36.1 °C)-98.9 °F (37.2 °C)] 97 °F (36.1 °C)  Pulse:  [73-85] 76  Resp:  [18-20] 18  SpO2:  [92 %-100 %] 94 %  BP: (133-190)/(65-85) 141/65     Weight: 85.2 kg (187 lb 13.3 oz)  Body mass index is 34.35 kg/m².     Physical Exam  Vitals and nursing note reviewed.   Constitutional:       General: She is not in acute distress.     Appearance: She is well-developed. She is ill-appearing. She is not diaphoretic.   HENT:      Head: Normocephalic.      Mouth/Throat:      Mouth: Mucous membranes are dry.   Eyes:      General: No scleral icterus.     Conjunctiva/sclera: Conjunctivae normal.      Pupils: Pupils are equal, round, and reactive to light.   Neck:      Vascular: No JVD.   Cardiovascular:      Rate and Rhythm: Normal rate and regular rhythm.      Heart sounds: Normal heart sounds. No murmur heard.    No friction rub. No gallop.   Pulmonary:      Effort: Pulmonary effort is normal. No respiratory distress.      Breath sounds: Normal breath sounds. No wheezing or rales.   Abdominal:      General: Bowel sounds are normal. There is no distension.      Palpations: Abdomen is soft.      Tenderness: There is no abdominal tenderness. There is right CVA tenderness and left CVA tenderness. There is no guarding or rebound.   Musculoskeletal:         General: No tenderness. Normal range of motion.      Cervical back: Normal range of motion and neck supple.   Lymphadenopathy:      Cervical: No cervical adenopathy.   Skin:     General: Skin is warm and dry.      Capillary Refill: Capillary refill takes less than 2 seconds.      Coloration: Skin is not pale.      Findings: No erythema or rash.   Neurological:      Mental Status: She is alert and oriented to person, place, and time.      Cranial Nerves: No cranial nerve deficit.      Sensory: No sensory  deficit.      Coordination: Coordination normal.      Deep Tendon Reflexes: Reflexes normal.   Psychiatric:         Behavior: Behavior normal.         Thought Content: Thought content normal.         Judgment: Judgment normal.            CRANIAL NERVES     CN III, IV, VI   Pupils are equal, round, and reactive to light.     Significant Labs: All pertinent labs within the past 24 hours have been reviewed.  Blood Culture: No results for input(s): LABBLOO in the last 48 hours.  CBC:   Recent Labs   Lab 07/06/23  1546   WBC 4.98   HGB 15.5   HCT 46.8        CMP:   Recent Labs   Lab 07/06/23  1546   *   K 4.6   CL 99   CO2 28   *   BUN 17   CREATININE 0.8   CALCIUM 9.8   PROT 8.0   ALBUMIN 3.6   BILITOT 0.6   ALKPHOS 88   AST 17   ALT 26   ANIONGAP 8     Lactic Acid:   Recent Labs   Lab 07/06/23  1650   LACTATE 1.5     Urine Culture: No results for input(s): LABURIN in the last 48 hours.  Urine Studies:   Recent Labs   Lab 07/06/23  1737   COLORU Red*   APPEARANCEUA Cloudy*   PHUR 5.0   SPECGRAV 1.010   PROTEINUA 3+*   GLUCUA 3+*   KETONESU Negative   BILIRUBINUA Negative   OCCULTUA 3+*   NITRITE Negative   UROBILINOGEN Negative   LEUKOCYTESUR Negative   RBCUA >100*   WBCUA 0   BACTERIA Rare   HYALINECASTS 0       Significant Imaging: I have reviewed all pertinent imaging results/findings within the past 24 hours.    CT:   IMPRESSION:     Large simple cyst abutting the anterior margin of the bladder and both ovaries. This appears to be arising from the left ovary however. This is not significantly changed from prior ultrasound.  Focal area of hypoattenuation upper outer cortex left kidney consistent with focal pyelonephritis.  Slight enhancement of the endothelial lining of the bladder suggesting cystitis.  Multi fibroid uterus with largest fibroid measuring up to 6.1 cm..  Fluid in the endometrial cavity. Recommend correlation with phase of menstrual cycle.  Diverticulosis coli.  Hepatosplenomegaly  with hepatic steatosis.  Cholelithiasis.    Assessment/Plan:     * Pyelonephritis  Acute problem   Urine culture pending   Blood cultures pending   IV fluids   Rocephin 1 gram q.12 IV   Follow urine culture and adjust antibiotics accordingly      Ovarian cyst  Acute problem   Gyn consulted by ER physician    Type 2 diabetes mellitus, with long-term current use of insulin  Patient's FSGs are uncontrolled due to hyperglycemia on current medication regimen.  Last A1c reviewed- No results found for: LABA1C, HGBA1C  Most recent fingerstick glucose reviewed- Recent Labs   Lab 07/06/23  2152   POCTGLUCOSE 76     Current correctional scale  Medium  Maintain anti-hyperglycemic dose as follows-   Antihyperglycemics (From admission, onward)    Start     Stop Route Frequency Ordered    07/06/23 2029  insulin aspart U-100 pen 1-10 Units         -- SubQ Before meals & nightly PRN 07/06/23 2021        Hold Oral hypoglycemics while patient is in the hospital.      VTE Risk Mitigation (From admission, onward)         Ordered     IP VTE HIGH RISK PATIENT  Once         07/06/23 2021     Place sequential compression device  Until discontinued         07/06/23 2021     Place RA hose  Until discontinued         07/06/23 2021                           Yves Gabriel NP  Department of Hospital Medicine  [facility]/Surg

## 2023-07-07 NOTE — PLAN OF CARE
Problem: Adult Inpatient Plan of Care  Goal: Plan of Care Review  Outcome: Ongoing, Progressing     Problem: Adult Inpatient Plan of Care  Goal: Absence of Hospital-Acquired Illness or Injury  Outcome: Ongoing, Progressing     Problem: Adult Inpatient Plan of Care  Goal: Optimal Comfort and Wellbeing  Outcome: Ongoing, Progressing     Problem: Adult Inpatient Plan of Care  Goal: Readiness for Transition of Care  Outcome: Ongoing, Progressing     Problem: Infection  Goal: Absence of Infection Signs and Symptoms  Outcome: Ongoing, Progressing     Problem: Diabetes Comorbidity  Goal: Blood Glucose Level Within Targeted Range  Outcome: Ongoing, Progressing     Problem: Malnutrition  Goal: Improved Nutritional Intake  Outcome: Ongoing, Progressing

## 2023-07-07 NOTE — PLAN OF CARE
Plan of care reviewed with patient. Verbalized understanding. IV intact and patent with fluids infusing. Tele in place and being monitored. Glucose monitored, no coverage needed. Pain managed with prn medications. Still passing a few small clots. Patient alert and able to make her needs known. Aware of NPO status. Safety maintained. Call light in reach and instructed to call for assistance. Will continue to monitor.

## 2023-07-07 NOTE — NURSING
Patient arrived to floor via wheelchair. Ambulated to bed without difficulty. Oriented to room and call system. Bed alarm set. Explained NPO status. Instructed to call for assistance.      Nurses Note -- 4 Eyes      7/6/2023   10:17 PM      Skin assessed during: Admit      [x] No Altered Skin Integrity Present    [x]Prevention Measures Documented      [] Yes- Altered Skin Integrity Present or Discovered   [] LDA Added if Not in Epic (Describe Wound)   [] New Altered Skin Integrity was Present on Admit and Documented in LDA   [] Wound Image Taken    Wound Care Consulted? No    Attending Nurse:  Theresa Felix LPN     Second RN/Staff Member:  Penny Nur RN       1 person assist

## 2023-07-07 NOTE — ASSESSMENT & PLAN NOTE
Patient's FSGs are uncontrolled due to hyperglycemia on current medication regimen.  Last A1c reviewed- No results found for: LABA1C, HGBA1C  Most recent fingerstick glucose reviewed- Recent Labs   Lab 07/06/23 2152   POCTGLUCOSE 76     Current correctional scale  Medium  Maintain anti-hyperglycemic dose as follows-   Antihyperglycemics (From admission, onward)    Start     Stop Route Frequency Ordered    07/06/23 2029  insulin aspart U-100 pen 1-10 Units         -- SubQ Before meals & nightly PRN 07/06/23 2021        Hold Oral hypoglycemics while patient is in the hospital.

## 2023-07-07 NOTE — SUBJECTIVE & OBJECTIVE
Past Medical History:   Diagnosis Date    Diabetes mellitus        History reviewed. No pertinent surgical history.    Review of patient's allergies indicates:  No Known Allergies    No current facility-administered medications on file prior to encounter.     No current outpatient medications on file prior to encounter.     Family History       Family history is unknown by patient.          Tobacco Use    Smoking status: Never    Smokeless tobacco: Never   Substance and Sexual Activity    Alcohol use: Not Currently    Drug use: Never    Sexual activity: Not on file     Review of Systems   Constitutional:  Positive for activity change, appetite change and fever. Negative for chills and diaphoresis.   HENT:  Negative for congestion, nosebleeds and tinnitus.    Eyes:  Negative for photophobia and visual disturbance.   Respiratory:  Negative for cough, chest tightness, shortness of breath and wheezing.    Cardiovascular:  Negative for chest pain, palpitations and leg swelling.   Gastrointestinal:  Positive for abdominal pain and nausea. Negative for abdominal distention, constipation, diarrhea and vomiting.   Endocrine: Negative for cold intolerance and heat intolerance.   Genitourinary:  Positive for difficulty urinating, dysuria, flank pain and frequency. Negative for hematuria and urgency.   Musculoskeletal:  Negative for arthralgias, back pain and myalgias.   Skin:  Negative for pallor, rash and wound.   Allergic/Immunologic: Negative for immunocompromised state.   Neurological:  Negative for dizziness, tremors, facial asymmetry, speech difficulty and weakness.   Hematological:  Negative for adenopathy. Does not bruise/bleed easily.   Psychiatric/Behavioral:  Negative for confusion and sleep disturbance. The patient is not nervous/anxious.    Objective:     Vital Signs (Most Recent):  Temp: 97 °F (36.1 °C) (07/06/23 2046)  Pulse: 76 (07/06/23 2046)  Resp: 18 (07/06/23 2046)  BP: (!) 141/65 (07/06/23 2046)  SpO2: (!)  94 % (07/06/23 2046) Vital Signs (24h Range):  Temp:  [97 °F (36.1 °C)-98.9 °F (37.2 °C)] 97 °F (36.1 °C)  Pulse:  [73-85] 76  Resp:  [18-20] 18  SpO2:  [92 %-100 %] 94 %  BP: (133-190)/(65-85) 141/65     Weight: 85.2 kg (187 lb 13.3 oz)  Body mass index is 34.35 kg/m².     Physical Exam  Vitals and nursing note reviewed.   Constitutional:       General: She is not in acute distress.     Appearance: She is well-developed. She is ill-appearing. She is not diaphoretic.   HENT:      Head: Normocephalic.      Mouth/Throat:      Mouth: Mucous membranes are dry.   Eyes:      General: No scleral icterus.     Conjunctiva/sclera: Conjunctivae normal.      Pupils: Pupils are equal, round, and reactive to light.   Neck:      Vascular: No JVD.   Cardiovascular:      Rate and Rhythm: Normal rate and regular rhythm.      Heart sounds: Normal heart sounds. No murmur heard.    No friction rub. No gallop.   Pulmonary:      Effort: Pulmonary effort is normal. No respiratory distress.      Breath sounds: Normal breath sounds. No wheezing or rales.   Abdominal:      General: Bowel sounds are normal. There is no distension.      Palpations: Abdomen is soft.      Tenderness: There is no abdominal tenderness. There is right CVA tenderness and left CVA tenderness. There is no guarding or rebound.   Musculoskeletal:         General: No tenderness. Normal range of motion.      Cervical back: Normal range of motion and neck supple.   Lymphadenopathy:      Cervical: No cervical adenopathy.   Skin:     General: Skin is warm and dry.      Capillary Refill: Capillary refill takes less than 2 seconds.      Coloration: Skin is not pale.      Findings: No erythema or rash.   Neurological:      Mental Status: She is alert and oriented to person, place, and time.      Cranial Nerves: No cranial nerve deficit.      Sensory: No sensory deficit.      Coordination: Coordination normal.      Deep Tendon Reflexes: Reflexes normal.   Psychiatric:          Behavior: Behavior normal.         Thought Content: Thought content normal.         Judgment: Judgment normal.            CRANIAL NERVES     CN III, IV, VI   Pupils are equal, round, and reactive to light.     Significant Labs: All pertinent labs within the past 24 hours have been reviewed.  Blood Culture: No results for input(s): LABBLOO in the last 48 hours.  CBC:   Recent Labs   Lab 07/06/23  1546   WBC 4.98   HGB 15.5   HCT 46.8        CMP:   Recent Labs   Lab 07/06/23  1546   *   K 4.6   CL 99   CO2 28   *   BUN 17   CREATININE 0.8   CALCIUM 9.8   PROT 8.0   ALBUMIN 3.6   BILITOT 0.6   ALKPHOS 88   AST 17   ALT 26   ANIONGAP 8     Lactic Acid:   Recent Labs   Lab 07/06/23  1650   LACTATE 1.5     Urine Culture: No results for input(s): LABURIN in the last 48 hours.  Urine Studies:   Recent Labs   Lab 07/06/23  1737   COLORU Red*   APPEARANCEUA Cloudy*   PHUR 5.0   SPECGRAV 1.010   PROTEINUA 3+*   GLUCUA 3+*   KETONESU Negative   BILIRUBINUA Negative   OCCULTUA 3+*   NITRITE Negative   UROBILINOGEN Negative   LEUKOCYTESUR Negative   RBCUA >100*   WBCUA 0   BACTERIA Rare   HYALINECASTS 0       Significant Imaging: I have reviewed all pertinent imaging results/findings within the past 24 hours.    CT:   IMPRESSION:     Large simple cyst abutting the anterior margin of the bladder and both ovaries. This appears to be arising from the left ovary however. This is not significantly changed from prior ultrasound.  Focal area of hypoattenuation upper outer cortex left kidney consistent with focal pyelonephritis.  Slight enhancement of the endothelial lining of the bladder suggesting cystitis.  Multi fibroid uterus with largest fibroid measuring up to 6.1 cm..  Fluid in the endometrial cavity. Recommend correlation with phase of menstrual cycle.  Diverticulosis coli.  Hepatosplenomegaly with hepatic steatosis.  Cholelithiasis.

## 2023-07-08 LAB
ALBUMIN SERPL BCP-MCNC: 2.8 G/DL (ref 3.5–5.2)
ALP SERPL-CCNC: 74 U/L (ref 55–135)
ALT SERPL W/O P-5'-P-CCNC: 18 U/L (ref 10–44)
ANION GAP SERPL CALC-SCNC: 8 MMOL/L (ref 8–16)
AST SERPL-CCNC: 15 U/L (ref 10–40)
BASOPHILS # BLD AUTO: 0.04 K/UL (ref 0–0.2)
BASOPHILS NFR BLD: 0.9 % (ref 0–1.9)
BILIRUB SERPL-MCNC: 0.3 MG/DL (ref 0.1–1)
BUN SERPL-MCNC: 22 MG/DL (ref 6–20)
CALCIUM SERPL-MCNC: 8.3 MG/DL (ref 8.7–10.5)
CHLORIDE SERPL-SCNC: 102 MMOL/L (ref 95–110)
CO2 SERPL-SCNC: 28 MMOL/L (ref 23–29)
CREAT SERPL-MCNC: 0.9 MG/DL (ref 0.5–1.4)
DIFFERENTIAL METHOD: ABNORMAL
EOSINOPHIL # BLD AUTO: 0.1 K/UL (ref 0–0.5)
EOSINOPHIL NFR BLD: 2.4 % (ref 0–8)
ERYTHROCYTE [DISTWIDTH] IN BLOOD BY AUTOMATED COUNT: 12.5 % (ref 11.5–14.5)
EST. GFR  (NO RACE VARIABLE): >60 ML/MIN/1.73 M^2
GLUCOSE SERPL-MCNC: 300 MG/DL (ref 70–110)
HCT VFR BLD AUTO: 41.8 % (ref 37–48.5)
HGB BLD-MCNC: 14 G/DL (ref 12–16)
IMM GRANULOCYTES # BLD AUTO: 0.02 K/UL (ref 0–0.04)
IMM GRANULOCYTES NFR BLD AUTO: 0.4 % (ref 0–0.5)
LYMPHOCYTES # BLD AUTO: 2.2 K/UL (ref 1–4.8)
LYMPHOCYTES NFR BLD: 46.1 % (ref 18–48)
MAGNESIUM SERPL-MCNC: 1.9 MG/DL (ref 1.6–2.6)
MCH RBC QN AUTO: 29.1 PG (ref 27–31)
MCHC RBC AUTO-ENTMCNC: 33.5 G/DL (ref 32–36)
MCV RBC AUTO: 87 FL (ref 82–98)
MONOCYTES # BLD AUTO: 0.4 K/UL (ref 0.3–1)
MONOCYTES NFR BLD: 7.9 % (ref 4–15)
NEUTROPHILS # BLD AUTO: 2 K/UL (ref 1.8–7.7)
NEUTROPHILS NFR BLD: 42.3 % (ref 38–73)
NRBC BLD-RTO: 0 /100 WBC
PHOSPHATE SERPL-MCNC: 3.8 MG/DL (ref 2.7–4.5)
PLATELET # BLD AUTO: 144 K/UL (ref 150–450)
PMV BLD AUTO: 9.8 FL (ref 9.2–12.9)
POCT GLUCOSE: 222 MG/DL (ref 70–110)
POCT GLUCOSE: 229 MG/DL (ref 70–110)
POCT GLUCOSE: 255 MG/DL (ref 70–110)
POCT GLUCOSE: 313 MG/DL (ref 70–110)
POTASSIUM SERPL-SCNC: 4.4 MMOL/L (ref 3.5–5.1)
PROT SERPL-MCNC: 6.3 G/DL (ref 6–8.4)
RBC # BLD AUTO: 4.81 M/UL (ref 4–5.4)
SODIUM SERPL-SCNC: 138 MMOL/L (ref 136–145)
WBC # BLD AUTO: 4.66 K/UL (ref 3.9–12.7)

## 2023-07-08 PROCEDURE — 99900035 HC TECH TIME PER 15 MIN (STAT)

## 2023-07-08 PROCEDURE — 80053 COMPREHEN METABOLIC PANEL: CPT | Performed by: NURSE PRACTITIONER

## 2023-07-08 PROCEDURE — 84100 ASSAY OF PHOSPHORUS: CPT | Performed by: NURSE PRACTITIONER

## 2023-07-08 PROCEDURE — 85025 COMPLETE CBC W/AUTO DIFF WBC: CPT | Performed by: NURSE PRACTITIONER

## 2023-07-08 PROCEDURE — 63600175 PHARM REV CODE 636 W HCPCS: Performed by: NURSE PRACTITIONER

## 2023-07-08 PROCEDURE — 94761 N-INVAS EAR/PLS OXIMETRY MLT: CPT

## 2023-07-08 PROCEDURE — 36415 COLL VENOUS BLD VENIPUNCTURE: CPT | Performed by: NURSE PRACTITIONER

## 2023-07-08 PROCEDURE — 25000003 PHARM REV CODE 250: Performed by: NURSE PRACTITIONER

## 2023-07-08 PROCEDURE — 96376 TX/PRO/DX INJ SAME DRUG ADON: CPT

## 2023-07-08 PROCEDURE — G0378 HOSPITAL OBSERVATION PER HR: HCPCS

## 2023-07-08 PROCEDURE — 83735 ASSAY OF MAGNESIUM: CPT | Performed by: NURSE PRACTITIONER

## 2023-07-08 PROCEDURE — 96372 THER/PROPH/DIAG INJ SC/IM: CPT | Performed by: NURSE PRACTITIONER

## 2023-07-08 RX ORDER — INSULIN GLARGINE 100 [IU]/ML
24 INJECTION, SOLUTION SUBCUTANEOUS
COMMUNITY
Start: 2023-04-16 | End: 2026-01-09

## 2023-07-08 RX ORDER — OXYCODONE AND ACETAMINOPHEN 10; 325 MG/1; MG/1
1 TABLET ORAL EVERY 4 HOURS PRN
Status: DISCONTINUED | OUTPATIENT
Start: 2023-07-08 | End: 2023-07-09 | Stop reason: HOSPADM

## 2023-07-08 RX ORDER — INSULIN GLARGINE 100 [IU]/ML
24 INJECTION, SOLUTION SUBCUTANEOUS
COMMUNITY
Start: 2023-05-30

## 2023-07-08 RX ADMIN — INSULIN DETEMIR 20 UNITS: 100 INJECTION, SOLUTION SUBCUTANEOUS at 09:07

## 2023-07-08 RX ADMIN — KETOROLAC TROMETHAMINE 15 MG: 30 INJECTION, SOLUTION INTRAMUSCULAR; INTRAVENOUS at 11:07

## 2023-07-08 RX ADMIN — INSULIN ASPART 4 UNITS: 100 INJECTION, SOLUTION INTRAVENOUS; SUBCUTANEOUS at 11:07

## 2023-07-08 RX ADMIN — INSULIN ASPART 2 UNITS: 100 INJECTION, SOLUTION INTRAVENOUS; SUBCUTANEOUS at 09:07

## 2023-07-08 RX ADMIN — KETOROLAC TROMETHAMINE 15 MG: 30 INJECTION, SOLUTION INTRAMUSCULAR; INTRAVENOUS at 06:07

## 2023-07-08 RX ADMIN — KETOROLAC TROMETHAMINE 15 MG: 30 INJECTION, SOLUTION INTRAMUSCULAR; INTRAVENOUS at 12:07

## 2023-07-08 RX ADMIN — INSULIN ASPART 6 UNITS: 100 INJECTION, SOLUTION INTRAVENOUS; SUBCUTANEOUS at 08:07

## 2023-07-08 RX ADMIN — KETOROLAC TROMETHAMINE 15 MG: 30 INJECTION, SOLUTION INTRAMUSCULAR; INTRAVENOUS at 05:07

## 2023-07-08 RX ADMIN — MORPHINE SULFATE 4 MG: 4 INJECTION INTRAVENOUS at 09:07

## 2023-07-08 RX ADMIN — CEFTRIAXONE 1 G: 1 INJECTION, POWDER, FOR SOLUTION INTRAMUSCULAR; INTRAVENOUS at 04:07

## 2023-07-08 RX ADMIN — CEFTRIAXONE 1 G: 1 INJECTION, POWDER, FOR SOLUTION INTRAMUSCULAR; INTRAVENOUS at 05:07

## 2023-07-08 NOTE — ASSESSMENT & PLAN NOTE
Acute problem   Gyn consulted by ER physician  Case discussed with Dr. Vanegas - plans for close follow-up post discharge with Dr. Vanegas in GYN clinic for PAP and EM BX next Tuesday. Then assuming benign results will plan for likely hysterectomy with BSO.

## 2023-07-08 NOTE — SUBJECTIVE & OBJECTIVE
Interval History:  Patient seen and examined.  No acute events since admission.  Is complaining of suprapubic and vaginal pain.  Feeling anxious today.  Discussed with patient plan of care.  She is aware that gynecology wants to see her next week after discharged.  Patient is worried about going home with vaginal pain.  Discussed with patient gynecology recommendations and she understands plan.    Review of Systems   Constitutional:  Positive for appetite change, fatigue and fever.   Genitourinary:  Positive for frequency, vaginal bleeding and vaginal pain.        Suprapubic pressure   Musculoskeletal:  Positive for back pain.   All other systems reviewed and are negative.  Objective:     Vital Signs (Most Recent):  Temp: 98 °F (36.7 °C) (07/08/23 1128)  Pulse: 74 (07/08/23 1128)  Resp: 18 (07/08/23 1128)  BP: (!) 182/91 (07/08/23 1128)  SpO2: (!) 94 % (07/08/23 1128) Vital Signs (24h Range):  Temp:  [96.6 °F (35.9 °C)-98 °F (36.7 °C)] 98 °F (36.7 °C)  Pulse:  [71-79] 74  Resp:  [16-18] 18  SpO2:  [92 %-96 %] 94 %  BP: (130-182)/(61-91) 182/91     Weight: 85.2 kg (187 lb 13.3 oz)  Body mass index is 34.35 kg/m².    Intake/Output Summary (Last 24 hours) at 7/8/2023 1229  Last data filed at 7/8/2023 0941  Gross per 24 hour   Intake 1962.25 ml   Output 700 ml   Net 1262.25 ml           Physical Exam  Constitutional:       Appearance: She is ill-appearing.   Cardiovascular:      Rate and Rhythm: Normal rate and regular rhythm.      Pulses: Normal pulses.      Heart sounds: Normal heart sounds.   Pulmonary:      Effort: Pulmonary effort is normal. No respiratory distress.      Breath sounds: Normal breath sounds.   Abdominal:      General: Bowel sounds are normal. There is no distension.      Palpations: Abdomen is soft.      Tenderness: There is abdominal tenderness (Suprapubic). There is no guarding.   Skin:     General: Skin is warm and dry.      Coloration: Skin is not pale.   Neurological:      Mental Status: She is  alert.           Significant Labs: All pertinent labs within the past 24 hours have been reviewed.  CBC:   Recent Labs   Lab 07/06/23  1546 07/07/23  0356 07/08/23  0430   WBC 4.98 5.97 4.66   HGB 15.5 14.0 14.0   HCT 46.8 43.0 41.8    145* 144*       CMP:   Recent Labs   Lab 07/06/23  1546 07/07/23  0356 07/08/23  0430   * 138 138   K 4.6 4.4 4.4   CL 99 102 102   CO2 28 27 28   * 150* 300*   BUN 17 14 22*   CREATININE 0.8 0.8 0.9   CALCIUM 9.8 8.8 8.3*   PROT 8.0 6.7 6.3   ALBUMIN 3.6 3.0* 2.8*   BILITOT 0.6 0.4 0.3   ALKPHOS 88 74 74   AST 17 15 15   ALT 26 18 18   ANIONGAP 8 9 8       Urine Studies:   Recent Labs   Lab 07/06/23  1737   COLORU Red*   APPEARANCEUA Cloudy*   PHUR 5.0   SPECGRAV 1.010   PROTEINUA 3+*   GLUCUA 3+*   KETONESU Negative   BILIRUBINUA Negative   OCCULTUA 3+*   NITRITE Negative   UROBILINOGEN Negative   LEUKOCYTESUR Negative   RBCUA >100*   WBCUA 0   BACTERIA Rare   HYALINECASTS 0         Significant Imaging: I have reviewed all pertinent imaging results/findings within the past 24 hours.

## 2023-07-08 NOTE — ASSESSMENT & PLAN NOTE
Acute problem   Urine culture pending   Blood cultures NGTD  IV fluids   Rocephin 1 gram q.12 IV   Follow urine culture and adjust antibiotics accordingly  Pain control with p.r.n. narcotics.

## 2023-07-08 NOTE — PROGRESS NOTES
[facility] Medicine  Progress Note    Patient Name: Boo Cameron  MRN: [id number]  Patient Class: OP- Observation   Admission Date: 7/6/2023  Length of Stay: 1 days  Attending Physician: Sue Asher MD  Primary Care Provider: Yuliet Holman NP        Subjective:     Principal Problem:Pyelonephritis        HPI:  Baljeet Cameron is a 48 year female who presents emergency room for evaluation of fever, nausea, abdominal pain, and flank pain.  She reports the symptoms onset approximately 4 weeks ago.  Four weeks ago she was seen by primary care and diagnosed with urinary tract infection.  She was treated with amoxicillin.  A week later she still had symptoms returned to her PCP and was started on Macrobid.  Approximately 1 week later symptoms have not improved and patient returned to PCP and was started on Bactrim DS.  She then returned to PCP for worsening of symptoms where she had an outpatient CT ordered and was started on p.o. Cipro which she is taking as directed.  Outpatient CT demonstrated a large ovarian/bladder cyst some focal pyelonephritis.  She was seen once by OBGYN but has yet to receive any follow-up.  Patient reports she did not have a urine culture ordered.  Just prior to arrival patient reported going to the restroom to attempt to void when she noticed a large blood clot in her urine.  Previous medical history includes diabetes.  She reports her glucose has been stable.  She reports compliance with her diabetic medications.  CBC unremarkable.  CMP with glucose of 264 otherwise unremarkable.  Urinalysis with rare bacteria greater than 100 red blood cells.  Patient admitted to Hospital Medicine for treatment and management.  Patient will be started on IV fluids and given Rocephin.  Urine culture has been ordered.      Overview/Hospital Course:  No notes on file    Interval History:  Patient seen and examined.  No acute events since admission.  Is complaining of suprapubic and  vaginal pain.  Feeling anxious today.  Discussed with patient plan of care.  She is aware that gynecology wants to see her next week after discharged.  Patient is worried about going home with vaginal pain.  Discussed with patient gynecology recommendations and she understands plan.    Review of Systems   Constitutional:  Positive for appetite change, fatigue and fever.   Genitourinary:  Positive for frequency, vaginal bleeding and vaginal pain.        Suprapubic pressure   Musculoskeletal:  Positive for back pain.   All other systems reviewed and are negative.  Objective:     Vital Signs (Most Recent):  Temp: 98 °F (36.7 °C) (07/08/23 1128)  Pulse: 74 (07/08/23 1128)  Resp: 18 (07/08/23 1128)  BP: (!) 182/91 (07/08/23 1128)  SpO2: (!) 94 % (07/08/23 1128) Vital Signs (24h Range):  Temp:  [96.6 °F (35.9 °C)-98 °F (36.7 °C)] 98 °F (36.7 °C)  Pulse:  [71-79] 74  Resp:  [16-18] 18  SpO2:  [92 %-96 %] 94 %  BP: (130-182)/(61-91) 182/91     Weight: 85.2 kg (187 lb 13.3 oz)  Body mass index is 34.35 kg/m².    Intake/Output Summary (Last 24 hours) at 7/8/2023 1229  Last data filed at 7/8/2023 0941  Gross per 24 hour   Intake 1962.25 ml   Output 700 ml   Net 1262.25 ml           Physical Exam  Constitutional:       Appearance: She is ill-appearing.   Cardiovascular:      Rate and Rhythm: Normal rate and regular rhythm.      Pulses: Normal pulses.      Heart sounds: Normal heart sounds.   Pulmonary:      Effort: Pulmonary effort is normal. No respiratory distress.      Breath sounds: Normal breath sounds.   Abdominal:      General: Bowel sounds are normal. There is no distension.      Palpations: Abdomen is soft.      Tenderness: There is abdominal tenderness (Suprapubic). There is no guarding.   Skin:     General: Skin is warm and dry.      Coloration: Skin is not pale.   Neurological:      Mental Status: She is alert.           Significant Labs: All pertinent labs within the past 24 hours have been reviewed.  CBC:   Recent  Labs   Lab 07/06/23  1546 07/07/23  0356 07/08/23  0430   WBC 4.98 5.97 4.66   HGB 15.5 14.0 14.0   HCT 46.8 43.0 41.8    145* 144*       CMP:   Recent Labs   Lab 07/06/23  1546 07/07/23  0356 07/08/23  0430   * 138 138   K 4.6 4.4 4.4   CL 99 102 102   CO2 28 27 28   * 150* 300*   BUN 17 14 22*   CREATININE 0.8 0.8 0.9   CALCIUM 9.8 8.8 8.3*   PROT 8.0 6.7 6.3   ALBUMIN 3.6 3.0* 2.8*   BILITOT 0.6 0.4 0.3   ALKPHOS 88 74 74   AST 17 15 15   ALT 26 18 18   ANIONGAP 8 9 8       Urine Studies:   Recent Labs   Lab 07/06/23  1737   COLORU Red*   APPEARANCEUA Cloudy*   PHUR 5.0   SPECGRAV 1.010   PROTEINUA 3+*   GLUCUA 3+*   KETONESU Negative   BILIRUBINUA Negative   OCCULTUA 3+*   NITRITE Negative   UROBILINOGEN Negative   LEUKOCYTESUR Negative   RBCUA >100*   WBCUA 0   BACTERIA Rare   HYALINECASTS 0         Significant Imaging: I have reviewed all pertinent imaging results/findings within the past 24 hours.    Microbiology Results (last 7 days)     Procedure Component Value Units Date/Time    Urine Culture High Risk [[id number]]  (Abnormal) Collected: 07/06/23 1856    Order Status: Completed Specimen: Urine, Clean Catch Updated: 07/08/23 0752     Urine Culture, Routine GRAM NEGATIVE LEAH, NON-LACTOSE   >100,000 cfu/ml  Identification and susceptibility pending      Narrative:      Indicated criteria for high risk culture:->Other  Other (specify):->pylenephritis on ct scan, multiple  antibiotics at outside facility without culture.    Blood culture #2 **CANNOT BE ORDERED STAT** [[id number]] Collected: 07/06/23 1651    Order Status: Completed Specimen: Blood from Antecubital, Left Arm Updated: 07/07/23 2032     Blood Culture, Routine No Growth to date      No Growth to date    Blood culture #1 **CANNOT BE ORDERED STAT** [[id number]] Collected: 07/06/23 1650    Order Status: Completed Specimen: Blood from Antecubital, Right Arm Updated: 07/07/23 2032     Blood Culture, Routine No Growth to date       No Growth to date          Assessment/Plan:      * Pyelonephritis  Acute problem   Urine culture pending   Blood cultures NGTD  IV fluids   Rocephin 1 gram q.12 IV   Follow urine culture and adjust antibiotics accordingly      Ovarian cyst  Acute problem   Gyn consulted by ER physician  Case discussed with Dr. Vanegas - plans for close follow-up post discharge with Dr. Vanegas in GYN clinic for PAP and EM BX next Tuesday. Then assuming benign results will plan for likely hysterectomy with BSO.    Type 2 diabetes mellitus, with long-term current use of insulin  Patient's FSGs are uncontrolled due to hyperglycemia on current medication regimen.  Last A1c reviewed- No results found for: LABA1C, HGBA1C  Most recent fingerstick glucose reviewed-   Recent Labs   Lab 07/07/23  1657 07/07/23  2113 07/08/23  0732 07/08/23  1116   POCTGLUCOSE 158* 284* 255* 222*     Current correctional scale  Medium  Maintain anti-hyperglycemic dose as follows-   Antihyperglycemics (From admission, onward)    Start     Stop Route Frequency Ordered    07/08/23 2100  insulin detemir U-100 (Levemir) pen 20 Units         -- SubQ Nightly 07/08/23 1202    07/06/23 2029  insulin aspart U-100 pen 1-10 Units         -- SubQ Before meals & nightly PRN 07/06/23 2021        Hold Oral hypoglycemics while patient is in the hospital.      VTE Risk Mitigation (From admission, onward)         Ordered     IP VTE HIGH RISK PATIENT  Once         07/06/23 2021     Place sequential compression device  Until discontinued         07/06/23 2021     Place RA hose  Until discontinued         07/06/23 2021                Discharge Planning   KARINA: 7/8/2023     Code Status: Full Code   Is the patient medically ready for discharge?:     Reason for patient still in hospital (select all that apply): Patient trending condition, Laboratory test and Treatment  Discharge Plan A: Nilesh Bowden NP  Department of Hospital Medicine   [facility]  Med/Surg

## 2023-07-08 NOTE — PLAN OF CARE
Problem: Adult Inpatient Plan of Care  Goal: Plan of Care Review  Outcome: Ongoing, Progressing     Problem: Adult Inpatient Plan of Care  Goal: Patient-Specific Goal (Individualized)  Outcome: Ongoing, Progressing     Problem: Adult Inpatient Plan of Care  Goal: Absence of Hospital-Acquired Illness or Injury  Outcome: Ongoing, Progressing     Problem: Adult Inpatient Plan of Care  Goal: Optimal Comfort and Wellbeing  Outcome: Ongoing, Progressing     Problem: Infection  Goal: Absence of Infection Signs and Symptoms  Outcome: Ongoing, Progressing     Problem: Diabetes Comorbidity  Goal: Blood Glucose Level Within Targeted Range  Outcome: Ongoing, Progressing

## 2023-07-08 NOTE — CONSULTS
GYN CONSULT    Boo is a 49yo P3, BTL for contraception, admitted to the hospital since yesterday for pyelonephritis.  GYN was consulted for further evaluation of a large ovarian cyst and fibroid uterus in the setting of pelvic pain.    Boo reports having fever with nausea and vomiting at the onset of this overall illness.  On 28 JUN is when she started feeling a light irritating feeling in her pelvis, mostly on the right side.  Over time it progressed to a more severe pain and now is a feeling of pressure in the RLQ and involving her right hip.  She also reports LUQ pain and back pain.  No longer has nausea and in fact reports a good appetite.  No bowel irregularities.  In order to empty her bladder completely she has to lean to the side - this has been happening only the past 2-3 months.    As an outpatient had been on several different antibitiotics for presumed urinary tract infection.  Also reports taking what sounds like diflucan weekly for a fungal skin infection.  No urine cultures available for review.    Boo hadn't seen a gynecologist in over 20 years.  No PAP screening in that timeframe.  States she had a LMP in DEC 2022, and that one was very light.  Then yesterday in the ER she passed a clot and then one more later that day.  Intermittent hot flashes, but nothing that bothers her much.  Saw a gynecologist (Dr. Martines in [place] on 03 JUL) after a pelvic US ordered by her PCM showed a cystic structure as below.  He ordered tumor markers and planned on seeing her again after the CT which was done later that day.  Boo has not seen him for f/u.    PMH = T2DM, HTN, HLD, anxiety, obesity  PSH = three c-sections  MEDS = lantus, trulicity, 2 meds for anxiety, something for her BP, fish oil for cholesterol  ALLERGEIS = codeine (GI upset)  OB HX = three children ages 23, 28, 30 - all c-sections  GYN HX = last PAP >20yrs ago, denies STD, LMP DEC 2022 as per HPI, BTL for contraception  SOC HX = denies  T/E/D,  x 22yrs, homemaker, lives in [facility]    PHYSICAL EXAM  Vitals reviewed, afebrile, blood sugars elevated    GEN = alert/oriented, pleasant,  present and supportive, patient appears to be in mild discomfort, favors lying on her right side  ABD = soft, some guarding present, ttp in LUQ, and across lower abdomen, firm mass palpated below U, no rebound tenderness, nondistended  PELVIC = deferred in hospital setting     = 37, CEA = 1.3, CA 19-9 = 15 (all wnl)    PELVIC US (28 jun) = uterus [id number], EMS 1.5cm, neither ovary seen, cystic mass in right adenxa [id number], no color flow inside, no ff    CT ABD/PELVIS (03 JUL) =  -at anterior margin of the bladder is a 10x8cm simple cyst - appears to be arising from L ovary, no significant change from US  -multi fibroid uterus with largest 6x4cm at posterior upper left uterus, fluid in endometrial cavity  -focal area of L kidney c/w pyelonephritis, enhancement of bladder lining suggests cystitis  -diverticulosis  -hepatosplenomegaly    A/P 49yo female with fibroid uterus, large simple cyst - likely ovarian, normal tumor markers, irregular episode of vaginal bleeding with thickened EMS, no cervical CA screen >20yrs.  Admitted for pyelo.    -after this hospitalization / resolution of pyelonephritis have patient f/u with me in GYN clinic for PAP and EM BX (can be next week)  -then assuming benign results will plan for likely hysterectomy with BSO  -plan of care discussed with Boo; she voiced understanding and agreement to plan  -will sign off for now; please call me if further GYN input is needed during hospitalization    MD MIYA

## 2023-07-08 NOTE — ASSESSMENT & PLAN NOTE
Patient's FSGs are uncontrolled due to hyperglycemia on current medication regimen.  Last A1c reviewed- No results found for: LABA1C, HGBA1C  Most recent fingerstick glucose reviewed-   Recent Labs   Lab 07/07/23  1657 07/07/23  2113 07/08/23  0732 07/08/23  1116   POCTGLUCOSE 158* 284* 255* 222*     Current correctional scale  Medium  Maintain anti-hyperglycemic dose as follows-   Antihyperglycemics (From admission, onward)    Start     Stop Route Frequency Ordered    07/08/23 2100  insulin detemir U-100 (Levemir) pen 20 Units         -- SubQ Nightly 07/08/23 1202    07/06/23 2029  insulin aspart U-100 pen 1-10 Units         -- SubQ Before meals & nightly PRN 07/06/23 2021        Hold Oral hypoglycemics while patient is in the hospital.

## 2023-07-09 VITALS
WEIGHT: 194.88 LBS | BODY MASS INDEX: 35.86 KG/M2 | HEART RATE: 75 BPM | DIASTOLIC BLOOD PRESSURE: 81 MMHG | OXYGEN SATURATION: 96 % | SYSTOLIC BLOOD PRESSURE: 182 MMHG | HEIGHT: 62 IN | TEMPERATURE: 98 F | RESPIRATION RATE: 17 BRPM

## 2023-07-09 LAB
ALBUMIN SERPL BCP-MCNC: 3.2 G/DL (ref 3.5–5.2)
ALP SERPL-CCNC: 74 U/L (ref 55–135)
ALT SERPL W/O P-5'-P-CCNC: 23 U/L (ref 10–44)
ANION GAP SERPL CALC-SCNC: 10 MMOL/L (ref 8–16)
AST SERPL-CCNC: 17 U/L (ref 10–40)
BASOPHILS # BLD AUTO: 0.04 K/UL (ref 0–0.2)
BASOPHILS NFR BLD: 0.8 % (ref 0–1.9)
BILIRUB SERPL-MCNC: 0.4 MG/DL (ref 0.1–1)
BUN SERPL-MCNC: 21 MG/DL (ref 6–20)
CALCIUM SERPL-MCNC: 8.9 MG/DL (ref 8.7–10.5)
CHLORIDE SERPL-SCNC: 102 MMOL/L (ref 95–110)
CO2 SERPL-SCNC: 25 MMOL/L (ref 23–29)
CREAT SERPL-MCNC: 0.8 MG/DL (ref 0.5–1.4)
DIFFERENTIAL METHOD: NORMAL
EOSINOPHIL # BLD AUTO: 0.1 K/UL (ref 0–0.5)
EOSINOPHIL NFR BLD: 2.8 % (ref 0–8)
ERYTHROCYTE [DISTWIDTH] IN BLOOD BY AUTOMATED COUNT: 12.5 % (ref 11.5–14.5)
EST. GFR  (NO RACE VARIABLE): >60 ML/MIN/1.73 M^2
GLUCOSE SERPL-MCNC: 217 MG/DL (ref 70–110)
HCT VFR BLD AUTO: 43.4 % (ref 37–48.5)
HGB BLD-MCNC: 14.8 G/DL (ref 12–16)
IMM GRANULOCYTES # BLD AUTO: 0.02 K/UL (ref 0–0.04)
IMM GRANULOCYTES NFR BLD AUTO: 0.4 % (ref 0–0.5)
LYMPHOCYTES # BLD AUTO: 1.9 K/UL (ref 1–4.8)
LYMPHOCYTES NFR BLD: 36.7 % (ref 18–48)
MAGNESIUM SERPL-MCNC: 1.9 MG/DL (ref 1.6–2.6)
MCH RBC QN AUTO: 29.5 PG (ref 27–31)
MCHC RBC AUTO-ENTMCNC: 34.1 G/DL (ref 32–36)
MCV RBC AUTO: 87 FL (ref 82–98)
MONOCYTES # BLD AUTO: 0.3 K/UL (ref 0.3–1)
MONOCYTES NFR BLD: 6 % (ref 4–15)
NEUTROPHILS # BLD AUTO: 2.7 K/UL (ref 1.8–7.7)
NEUTROPHILS NFR BLD: 53.3 % (ref 38–73)
NRBC BLD-RTO: 0 /100 WBC
PHOSPHATE SERPL-MCNC: 3.6 MG/DL (ref 2.7–4.5)
PLATELET # BLD AUTO: 151 K/UL (ref 150–450)
PMV BLD AUTO: 10.3 FL (ref 9.2–12.9)
POCT GLUCOSE: 194 MG/DL (ref 70–110)
POCT GLUCOSE: 219 MG/DL (ref 70–110)
POCT GLUCOSE: 240 MG/DL (ref 70–110)
POTASSIUM SERPL-SCNC: 4.3 MMOL/L (ref 3.5–5.1)
PROT SERPL-MCNC: 7 G/DL (ref 6–8.4)
RBC # BLD AUTO: 5.01 M/UL (ref 4–5.4)
SODIUM SERPL-SCNC: 137 MMOL/L (ref 136–145)
WBC # BLD AUTO: 5.04 K/UL (ref 3.9–12.7)

## 2023-07-09 PROCEDURE — 96375 TX/PRO/DX INJ NEW DRUG ADDON: CPT

## 2023-07-09 PROCEDURE — 94761 N-INVAS EAR/PLS OXIMETRY MLT: CPT

## 2023-07-09 PROCEDURE — 25000003 PHARM REV CODE 250: Performed by: NURSE PRACTITIONER

## 2023-07-09 PROCEDURE — 96376 TX/PRO/DX INJ SAME DRUG ADON: CPT

## 2023-07-09 PROCEDURE — 99900035 HC TECH TIME PER 15 MIN (STAT)

## 2023-07-09 PROCEDURE — 85025 COMPLETE CBC W/AUTO DIFF WBC: CPT | Performed by: NURSE PRACTITIONER

## 2023-07-09 PROCEDURE — 84100 ASSAY OF PHOSPHORUS: CPT | Performed by: NURSE PRACTITIONER

## 2023-07-09 PROCEDURE — 83735 ASSAY OF MAGNESIUM: CPT | Performed by: NURSE PRACTITIONER

## 2023-07-09 PROCEDURE — G0378 HOSPITAL OBSERVATION PER HR: HCPCS

## 2023-07-09 PROCEDURE — 36415 COLL VENOUS BLD VENIPUNCTURE: CPT | Performed by: NURSE PRACTITIONER

## 2023-07-09 PROCEDURE — 63600175 PHARM REV CODE 636 W HCPCS: Performed by: NURSE PRACTITIONER

## 2023-07-09 PROCEDURE — 80053 COMPREHEN METABOLIC PANEL: CPT | Performed by: NURSE PRACTITIONER

## 2023-07-09 RX ORDER — HYDRALAZINE HYDROCHLORIDE 20 MG/ML
10 INJECTION INTRAMUSCULAR; INTRAVENOUS EVERY 6 HOURS PRN
Status: DISCONTINUED | OUTPATIENT
Start: 2023-07-09 | End: 2023-07-09 | Stop reason: HOSPADM

## 2023-07-09 RX ORDER — LOSARTAN POTASSIUM 50 MG/1
50 TABLET ORAL DAILY
COMMUNITY
Start: 2023-04-24

## 2023-07-09 RX ORDER — CIPROFLOXACIN 500 MG/1
500 TABLET ORAL EVERY 12 HOURS
Qty: 20 TABLET | Refills: 0 | Status: SHIPPED | OUTPATIENT
Start: 2023-07-09 | End: 2023-07-19

## 2023-07-09 RX ORDER — HYDROCHLOROTHIAZIDE 12.5 MG/1
12.5 CAPSULE ORAL DAILY
COMMUNITY
Start: 2023-03-23 | End: 2024-03-22

## 2023-07-09 RX ORDER — OXYCODONE AND ACETAMINOPHEN 7.5; 325 MG/1; MG/1
1 TABLET ORAL EVERY 6 HOURS PRN
Qty: 18 TABLET | Refills: 0 | Status: SHIPPED | OUTPATIENT
Start: 2023-07-09

## 2023-07-09 RX ORDER — LOSARTAN POTASSIUM 25 MG/1
50 TABLET ORAL DAILY
Status: DISCONTINUED | OUTPATIENT
Start: 2023-07-09 | End: 2023-07-09 | Stop reason: HOSPADM

## 2023-07-09 RX ADMIN — HYDRALAZINE HYDROCHLORIDE 10 MG: 20 INJECTION INTRAMUSCULAR; INTRAVENOUS at 08:07

## 2023-07-09 RX ADMIN — KETOROLAC TROMETHAMINE 15 MG: 30 INJECTION, SOLUTION INTRAMUSCULAR; INTRAVENOUS at 12:07

## 2023-07-09 RX ADMIN — CEFTRIAXONE 1 G: 1 INJECTION, POWDER, FOR SOLUTION INTRAMUSCULAR; INTRAVENOUS at 05:07

## 2023-07-09 RX ADMIN — INSULIN ASPART 4 UNITS: 100 INJECTION, SOLUTION INTRAVENOUS; SUBCUTANEOUS at 08:07

## 2023-07-09 RX ADMIN — KETOROLAC TROMETHAMINE 15 MG: 30 INJECTION, SOLUTION INTRAMUSCULAR; INTRAVENOUS at 11:07

## 2023-07-09 RX ADMIN — ONDANSETRON 4 MG: 2 INJECTION INTRAMUSCULAR; INTRAVENOUS at 08:07

## 2023-07-09 RX ADMIN — KETOROLAC TROMETHAMINE 15 MG: 30 INJECTION, SOLUTION INTRAMUSCULAR; INTRAVENOUS at 05:07

## 2023-07-09 RX ADMIN — INSULIN ASPART 4 UNITS: 100 INJECTION, SOLUTION INTRAVENOUS; SUBCUTANEOUS at 11:07

## 2023-07-09 RX ADMIN — ACETAMINOPHEN 650 MG: 325 TABLET ORAL at 08:07

## 2023-07-09 RX ADMIN — LOSARTAN POTASSIUM 50 MG: 25 TABLET, FILM COATED ORAL at 05:07

## 2023-07-09 NOTE — PLAN OF CARE
Pt cleared for discharge from case management     07/09/23 1145   Final Note   Assessment Type Final Discharge Note   Anticipated Discharge Disposition Home   What phone number can be called within the next 1-3 days to see how you are doing after discharge?   ([phone])   Hospital Resources/Appts/Education Provided Appointments scheduled and added to AVS

## 2023-07-09 NOTE — HOSPITAL COURSE
Patient was admitted with pyelonephritis and vaginal pain.  Patient was monitored closely during her stay.  Her labs and vital signs were trended closely.  She was placed on IV antibiotics and urine culture was sent to the lab.  Gynecology was consulted and patient was evaluated.  Recommendations were made to follow up outpatient after treatment for pyelonephritis.  Patient's pain was treated with IV and oral narcotics p.r.n..  Toradol was added to her regimen.  She expressed readiness for discharge.  Discussed with patient may need adjustments in her oral home regimen since urine culture identification and susceptibility had not been completed at time of discharge.  Patient with good understanding of discharge instructions and plan of care post discharge.  Return precautions were also discussed with good understanding.  Patient was seen and evaluated on day of discharge and deemed appropriate.

## 2023-07-09 NOTE — PLAN OF CARE
Problem: Adult Inpatient Plan of Care  Goal: Plan of Care Review  Outcome: Ongoing, Progressing     Problem: Adult Inpatient Plan of Care  Goal: Patient-Specific Goal (Individualized)  Outcome: Ongoing, Progressing     Problem: Adult Inpatient Plan of Care  Goal: Absence of Hospital-Acquired Illness or Injury  Outcome: Ongoing, Progressing     Problem: Adult Inpatient Plan of Care  Goal: Optimal Comfort and Wellbeing  Outcome: Ongoing, Progressing     Problem: Infection  Goal: Absence of Infection Signs and Symptoms  Outcome: Ongoing, Progressing     Problem: Diabetes Comorbidity  Goal: Blood Glucose Level Within Targeted Range  Outcome: Ongoing, Progressing   POC discussed with patient, verbalized understanding. IV SL to left arm remains intact and patent. Iv antibiotics and toradol admin as ordered.  Pain well controlled with iv toradol. Pt has not required pain meds in between scheduled toradol.  Voiding without difficulty. Ambulates to restroom independently. Q2H purposeful rounding. Safety measures maintained with side rails up, bed alarm on, slip resistant socks on, call light in reach, pt instructed to call for needs.

## 2023-07-09 NOTE — DISCHARGE SUMMARY
[facility] Medicine  Discharge Summary      Patient Name: Boo Cameron  MRN: [id number]  MARY: [id number]  Patient Class: OP- Observation  Admission Date: 7/6/2023  Hospital Length of Stay: 1 days  Discharge Date and Time: 7/9/2023 12:10 PM  Attending Physician: Gaby att. providers found   Discharging Provider: Rebecca Bowden NP  Primary Care Provider: Yuliet Holman NP    Primary Care Team: Networked reference to record PCT     HPI:   Baljeet Cameron is a 48 year female who presents emergency room for evaluation of fever, nausea, abdominal pain, and flank pain.  She reports the symptoms onset approximately 4 weeks ago.  Four weeks ago she was seen by primary care and diagnosed with urinary tract infection.  She was treated with amoxicillin.  A week later she still had symptoms returned to her PCP and was started on Macrobid.  Approximately 1 week later symptoms have not improved and patient returned to PCP and was started on Bactrim DS.  She then returned to PCP for worsening of symptoms where she had an outpatient CT ordered and was started on p.o. Cipro which she is taking as directed.  Outpatient CT demonstrated a large ovarian/bladder cyst some focal pyelonephritis.  She was seen once by OBGYN but has yet to receive any follow-up.  Patient reports she did not have a urine culture ordered.  Just prior to arrival patient reported going to the restroom to attempt to void when she noticed a large blood clot in her urine.  Previous medical history includes diabetes.  She reports her glucose has been stable.  She reports compliance with her diabetic medications.  CBC unremarkable.  CMP with glucose of 264 otherwise unremarkable.  Urinalysis with rare bacteria greater than 100 red blood cells.  Patient admitted to Hospital Medicine for treatment and management.  Patient will be started on IV fluids and given Rocephin.  Urine culture has been ordered.      * No surgery found *      Hospital  Course:   Patient was admitted with pyelonephritis and vaginal pain.  Patient was monitored closely during her stay.  Her labs and vital signs were trended closely.  She was placed on IV antibiotics and urine culture was sent to the lab.  Gynecology was consulted and patient was evaluated.  Recommendations were made to follow up outpatient after treatment for pyelonephritis.  Patient's pain was treated with IV and oral narcotics p.r.n..  Toradol was added to her regimen.  She expressed readiness for discharge.  Discussed with patient may need adjustments in her oral home regimen since urine culture identification and susceptibility had not been completed at time of discharge.  Patient with good understanding of discharge instructions and plan of care post discharge.  Return precautions were also discussed with good understanding.  Patient was seen and evaluated on day of discharge and deemed appropriate.       Goals of Care Treatment Preferences:  Code Status: Full Code      Consults:   Consults (From admission, onward)        Status Ordering Provider     IP consult to dietary  Once        Provider:  (Not yet assigned)    Completed ENRIQUETA ALBERT     Inpatient consult to Obstetrics  Once        Provider:  (Not yet assigned)    Completed ENRIQUETA ALBERT          No new Assessment & Plan notes have been filed under this hospital service since the last note was generated.  Service: Hospital Medicine    Final Active Diagnoses:    Diagnosis Date Noted POA    PRINCIPAL PROBLEM:  Pyelonephritis [N12] 07/06/2023 Yes    Mild malnutrition [E44.1] 07/07/2023 Yes    Type 2 diabetes mellitus, with long-term current use of insulin [E11.9, Z79.4] 07/06/2023 Not Applicable    Ovarian cyst [N83.209] 07/06/2023 Yes      Problems Resolved During this Admission:       Discharged Condition: good    Disposition: Home or Self Care    Follow Up:   Follow-up Information     Yuliet Holman NP Follow up on 7/14/2023.    Specialty:  Family Medicine  Why: at 10:15am  Contact information:  [address]  [facility]  [place] [address]  [phone]             Savanna Vanegas MD. Go on 7/11/2023.    Specialty: Obstetrics and Gynecology  Why: Appointment scheduled 7/11/2023 at 1020am.  Contact information:  [address] [facility] [address]  [facility] [address]  [phone]                       Patient Instructions:      Diet diabetic     Notify your health care provider if you experience any of the following:  temperature >100.4     Notify your health care provider if you experience any of the following:  persistent nausea and vomiting or diarrhea     Notify your health care provider if you experience any of the following:  severe uncontrolled pain     Activity as tolerated       Significant Diagnostic Studies: Labs:   CMP   Recent Labs   Lab 07/08/23  0430 07/09/23  0506    137   K 4.4 4.3    102   CO2 28 25   * 217*   BUN 22* 21*   CREATININE 0.9 0.8   CALCIUM 8.3* 8.9   PROT 6.3 7.0   ALBUMIN 2.8* 3.2*   BILITOT 0.3 0.4   ALKPHOS 74 74   AST 15 17   ALT 18 23   ANIONGAP 8 10   , CBC   Recent Labs   Lab 07/08/23  0430 07/09/23  0506   WBC 4.66 5.04   HGB 14.0 14.8   HCT 41.8 43.4   * 151    and All labs within the past 24 hours have been reviewed  Microbiology:   Urine Culture    Lab Results   Component Value Date    LABURIN (A) 07/06/2023     GRAM NEGATIVE LEAH, NON-LACTOSE   >100,000 cfu/ml  Identification and susceptibility pending           Pending Diagnostic Studies:     None         Medications:  Reconciled Home Medications:      Medication List      START taking these medications    ciprofloxacin HCl 500 MG tablet  Commonly known as: CIPRO  Take 1 tablet (500 mg total) by mouth every 12 (twelve) hours. for 10 days     oxyCODONE-acetaminophen 7.5-325 mg per tablet  Commonly known as: PERCOCET  Take 1 tablet by mouth every 6 (six) hours as needed for Pain.        CONTINUE taking these  medications    hydroCHLOROthiazide 12.5 mg capsule  Commonly known as: MICROZIDE  Take 12.5 mg by mouth once daily.     * insulin glargine 100 units/mL SubQ pen  Inject 20 Units into the skin.     * LANTUS SOLOSTAR U-100 INSULIN glargine 100 units/mL SubQ pen  Generic drug: insulin  INJECT 20 UNITS SUBCUTANEOUSLY NIGHTLY     losartan 50 MG tablet  Commonly known as: COZAAR  Take 50 mg by mouth once daily.         * This list has 2 medication(s) that are the same as other medications prescribed for you. Read the directions carefully, and ask your doctor or other care provider to review them with you.                Indwelling Lines/Drains at time of discharge:   Lines/Drains/Airways     None                 Time spent on the discharge of patient: 34 minutes         Rebecca Bowden NP  Department of Hospital Medicine  [facility]/Surg

## 2023-07-09 NOTE — PLAN OF CARE
Problem: Adult Inpatient Plan of Care  Goal: Plan of Care Review  Outcome: Met  Goal: Patient-Specific Goal (Individualized)  Outcome: Met  Goal: Absence of Hospital-Acquired Illness or Injury  Outcome: Met  Goal: Optimal Comfort and Wellbeing  Outcome: Met  Goal: Readiness for Transition of Care  Outcome: Met     Problem: Infection  Goal: Absence of Infection Signs and Symptoms  Outcome: Met     Problem: Diabetes Comorbidity  Goal: Blood Glucose Level Within Targeted Range  Outcome: Met     Problem: Malnutrition  Goal: Improved Nutritional Intake  Outcome: Met   POC has been reviewed with pt.  Pt insisting on going home and is ok to discharge home.  Pt is still waiting on blood culture results.  Pt will be going home with PO ABTs.

## 2023-07-09 NOTE — RESPIRATORY THERAPY
07/08/23 1916   Patient Assessment/Suction   Level of Consciousness (AVPU) alert   Respiratory Effort Normal;Unlabored   Expansion/Accessory Muscles/Retractions expansion symmetric   PRE-TX-O2   Device (Oxygen Therapy) room air   SpO2 95 %   Pulse Oximetry Type Intermittent   $ Pulse Oximetry - Multiple Charge Pulse Oximetry - Multiple   Pulse 71   Resp 18   Aerosol Therapy   $ Aerosol Therapy Charges PRN treatment not required   Respiratory Treatment Status (SVN) PRN treatment not required

## 2023-07-10 LAB — BACTERIA UR CULT: ABNORMAL

## 2023-07-11 ENCOUNTER — OFFICE VISIT (OUTPATIENT)
Dept: OBSTETRICS AND GYNECOLOGY | Facility: CLINIC | Age: 48
End: 2023-07-11
Payer: COMMERCIAL

## 2023-07-11 VITALS
WEIGHT: 190.69 LBS | DIASTOLIC BLOOD PRESSURE: 92 MMHG | RESPIRATION RATE: 18 BRPM | HEIGHT: 62 IN | BODY MASS INDEX: 35.09 KG/M2 | SYSTOLIC BLOOD PRESSURE: 150 MMHG

## 2023-07-11 DIAGNOSIS — Z12.4 CERVICAL CANCER SCREENING: ICD-10-CM

## 2023-07-11 DIAGNOSIS — N95.0 POSTMENOPAUSAL BLEEDING: Primary | ICD-10-CM

## 2023-07-11 DIAGNOSIS — Z12.39 ENCOUNTER FOR SCREENING FOR MALIGNANT NEOPLASM OF BREAST, UNSPECIFIED SCREENING MODALITY: ICD-10-CM

## 2023-07-11 LAB
BACTERIA BLD CULT: NORMAL
BACTERIA BLD CULT: NORMAL

## 2023-07-11 PROCEDURE — 4010F PR ACE/ARB THEARPY RXD/TAKEN: ICD-10-PCS | Mod: CPTII,S$GLB,, | Performed by: GENERAL PRACTICE

## 2023-07-11 PROCEDURE — 99999 PR PBB SHADOW E&M-EST. PATIENT-LVL III: CPT | Mod: PBBFAC,,, | Performed by: GENERAL PRACTICE

## 2023-07-11 PROCEDURE — 99396 PREV VISIT EST AGE 40-64: CPT | Mod: S$GLB,,, | Performed by: GENERAL PRACTICE

## 2023-07-11 PROCEDURE — 99999 PR PBB SHADOW E&M-EST. PATIENT-LVL III: ICD-10-PCS | Mod: PBBFAC,,, | Performed by: GENERAL PRACTICE

## 2023-07-11 PROCEDURE — 87624 HPV HI-RISK TYP POOLED RSLT: CPT | Performed by: GENERAL PRACTICE

## 2023-07-11 PROCEDURE — 1111F PR DISCHARGE MEDS RECONCILED W/ CURRENT OUTPATIENT MED LIST: ICD-10-PCS | Mod: CPTII,S$GLB,, | Performed by: GENERAL PRACTICE

## 2023-07-11 PROCEDURE — 3008F PR BODY MASS INDEX (BMI) DOCUMENTED: ICD-10-PCS | Mod: CPTII,S$GLB,, | Performed by: GENERAL PRACTICE

## 2023-07-11 PROCEDURE — 88141 CYTOPATH C/V INTERPRET: CPT | Mod: ,,, | Performed by: PATHOLOGY

## 2023-07-11 PROCEDURE — 99396 PR PREVENTIVE VISIT,EST,40-64: ICD-10-PCS | Mod: S$GLB,,, | Performed by: GENERAL PRACTICE

## 2023-07-11 PROCEDURE — 4010F ACE/ARB THERAPY RXD/TAKEN: CPT | Mod: CPTII,S$GLB,, | Performed by: GENERAL PRACTICE

## 2023-07-11 PROCEDURE — 3077F SYST BP >= 140 MM HG: CPT | Mod: CPTII,S$GLB,, | Performed by: GENERAL PRACTICE

## 2023-07-11 PROCEDURE — 3077F PR MOST RECENT SYSTOLIC BLOOD PRESSURE >= 140 MM HG: ICD-10-PCS | Mod: CPTII,S$GLB,, | Performed by: GENERAL PRACTICE

## 2023-07-11 PROCEDURE — 88305 TISSUE EXAM BY PATHOLOGIST: CPT | Performed by: PATHOLOGY

## 2023-07-11 PROCEDURE — 88305 TISSUE EXAM BY PATHOLOGIST: CPT | Mod: 26,,, | Performed by: PATHOLOGY

## 2023-07-11 PROCEDURE — 3080F PR MOST RECENT DIASTOLIC BLOOD PRESSURE >= 90 MM HG: ICD-10-PCS | Mod: CPTII,S$GLB,, | Performed by: GENERAL PRACTICE

## 2023-07-11 PROCEDURE — 88141 PR  CYTOPATH CERV/VAG INTERPRET: ICD-10-PCS | Mod: ,,, | Performed by: PATHOLOGY

## 2023-07-11 PROCEDURE — 3080F DIAST BP >= 90 MM HG: CPT | Mod: CPTII,S$GLB,, | Performed by: GENERAL PRACTICE

## 2023-07-11 PROCEDURE — 88175 CYTOPATH C/V AUTO FLUID REDO: CPT | Performed by: PATHOLOGY

## 2023-07-11 PROCEDURE — 1159F MED LIST DOCD IN RCRD: CPT | Mod: CPTII,S$GLB,, | Performed by: GENERAL PRACTICE

## 2023-07-11 PROCEDURE — 1159F PR MEDICATION LIST DOCUMENTED IN MEDICAL RECORD: ICD-10-PCS | Mod: CPTII,S$GLB,, | Performed by: GENERAL PRACTICE

## 2023-07-11 PROCEDURE — 3008F BODY MASS INDEX DOCD: CPT | Mod: CPTII,S$GLB,, | Performed by: GENERAL PRACTICE

## 2023-07-11 PROCEDURE — 88305 TISSUE EXAM BY PATHOLOGIST: ICD-10-PCS | Mod: 26,,, | Performed by: PATHOLOGY

## 2023-07-11 PROCEDURE — 1111F DSCHRG MED/CURRENT MED MERGE: CPT | Mod: CPTII,S$GLB,, | Performed by: GENERAL PRACTICE

## 2023-07-11 RX ORDER — GLIPIZIDE 10 MG/1
10 TABLET ORAL
COMMUNITY
Start: 2023-03-23 | End: 2025-12-16

## 2023-07-11 RX ORDER — DAPAGLIFLOZIN 10 MG/1
10 TABLET, FILM COATED ORAL
COMMUNITY
Start: 2023-04-26

## 2023-07-11 RX ORDER — DULAGLUTIDE 0.75 MG/.5ML
INJECTION, SOLUTION SUBCUTANEOUS
COMMUNITY
Start: 2023-06-28

## 2023-07-11 RX ORDER — HYDROXYZINE PAMOATE 25 MG/1
1 CAPSULE ORAL 3 TIMES DAILY PRN
COMMUNITY
Start: 2023-03-23 | End: 2025-12-16

## 2023-07-11 RX ORDER — SERTRALINE HYDROCHLORIDE 50 MG/1
50 TABLET, FILM COATED ORAL
COMMUNITY
Start: 2023-05-08 | End: 2026-01-31

## 2023-07-11 NOTE — PROGRESS NOTES
Boo is a 47yo P3, BTL for contraception, who was admitted to the hospital 06 JUL for pyelonephritis.  GYN was consulted for further evaluation of a large ovarian cyst and fibroid uterus in the setting of pelvic pain.  She has been d/c'ed home on cipro for continued treatment of the pyelo.     Boo reports having fever with nausea and vomiting at the onset of this overall illness.  On 28 JUN is when she started feeling a light irritating feeling in her pelvis, mostly on the right side.  Over time it progressed to a more severe pain and was then a feeling of pressure in the RLQ and involving her right hip.  She also reported LUQ pain and back pain.  No bowel irregularities.  In order to empty her bladder completely she has to lean to the side - this has been happening only the past 2-3 months.  As an outpatient prior to admission had been on several different antibitiotics for presumed urinary tract infection.  Also reports taking what sounds like diflucan weekly for a fungal skin infection.  No urine cultures available for review.     Boo hadn't seen a gynecologist in over 20 years.  No PAP screening in that timeframe.  States she had a LMP in DEC 2022, and that one was very light (over time they had been very regular but getting lighter).  Then in the ER on 06 JUL she passed a clot and then one more later that day.  Intermittent hot flashes, but nothing that bothers her much.  Saw a gynecologist (Dr. Martines in [place] on 03 JUL) after a pelvic US ordered by her PCM showed a cystic structure as below.  He ordered tumor markers which all came back normal.     PMH = T2DM, HTN, HLD, anxiety, obesity    PSH = three c-sections    MEDS =  sertraline and vistaril for anxiety  Lantus BID, farxiga at night, glipizide BID  Trulicity (holding for now)  HCTZ and losartan  fish oil for cholesterol  Cipro acutely for pyelo  Not needing pain pills    ALLERGEIS = codeine (GI upset)    OB HX = three children ages 24, 28, 30  - all c-sections    GYN HX = last PAP >20yrs ago, denies STD, LMP DEC 2022 as per HPI, BTL for contraception, sexually active with her  though last time probably ~4 weeks prior to admission, last few times does note a pressure feeling deep inside with sex though not outright painful    SOC HX = denies T/E/D,  x 22yrs, homemaker, lives in [place] [place], has 3 grandchildren + 2 adopted children living at home ages 12 down to 1 (her 3 children are grown and gone)     PHYSICAL EXAM  Vitals:    07/11/23 1029   BP: (!) 150/92   Resp: 18     Informed Consent: The indication for endometrial biopsy (postmenopausal bleeding) has been reviewed with the patient.  She understands the risks of the procedure (bleeding, infection, pain, uterine perforation, inadequate tissue for diagnosis) and agrees to proceed.  All of her questions have been answered.    Site Verification:  Proper patient, procedure, and site were confirmed prior to starting.    GEN = alert/oriented, pleasant, appears much improved since admission, walking normally  ABD = soft, mildly ttp across lower abdomen R>L, firm mass palpated below U, no rebound tenderness, nondistended - improved from admission  PELVIC = nefg, no lesions, normal vaginal mucosa with no lesions, small dark blood in vagina, CVX normal without lesions, PAP obtained, CVX cleaned with iodine, tenaculum placed to anterior lip of CVX, unable to pass pipelle so cervcial dilator placed - unable to advance much d/t patient discomfort and closing of legs, pipelle placed again and advance a bit further but not confident it was in uterine cavity - unable to read sound measurement d/t patient closing her legs, one pass made      = 37, CEA = 1.3, CA 19-9 = 15 (all wnl)     PELVIC US (28 jun) = uterus [id number], EMS 1.5cm, neither ovary seen, cystic mass in right adenxa [id number], no color flow inside, no ff     CT ABD/PELVIS (03 JUL) =  -at anterior margin of the bladder is a 10x8cm simple  cyst - appears to be arising from L ovary, no significant change from US  -multi fibroid uterus with largest 6x4cm at posterior upper left uterus, fluid in endometrial cavity  -focal area of L kidney c/w pyelonephritis, enhancement of bladder lining suggests cystitis  -diverticulosis  -hepatosplenomegaly     A/P 49yo female with fibroid uterus, large simple cyst - likely ovarian, normal tumor markers, irregular episode of vaginal bleeding with thickened EMS, no cervical CA screen >20yrs.  Still taking cipro for recent pyelonephritis admission.     -f/u results of PAP/HPV  -f/u results of EM Bx  -MMG ordered  -RTC in 7-10 days to discuss results and next steps     MD MIYA

## 2023-07-13 LAB
FINAL PATHOLOGIC DIAGNOSIS: NORMAL
GROSS: NORMAL
Lab: NORMAL

## 2023-07-14 ENCOUNTER — TELEPHONE (OUTPATIENT)
Dept: OBSTETRICS AND GYNECOLOGY | Facility: CLINIC | Age: 48
End: 2023-07-14
Payer: COMMERCIAL

## 2023-07-14 LAB
HPV HR 12 DNA SPEC QL NAA+PROBE: NEGATIVE
HPV16 AG SPEC QL: NEGATIVE
HPV18 DNA SPEC QL NAA+PROBE: NEGATIVE

## 2023-07-14 NOTE — TELEPHONE ENCOUNTER
Notified pt of providers Dr. Vanegas recommended. Informed her to let them know she would like to est care in their office due to her insurance not being covered in [place]. Pt voiced understanding.

## 2023-07-14 NOTE — TELEPHONE ENCOUNTER
----- Message from Marybeth Mello sent at 7/14/2023 11:10 AM CDT -----  Regarding: advice  Type: Needs Medical Advice  Who Called:  pt  :    Best Call Back Number: [phone]    Additional Information: pt wants to know if  goes to [place]. Please call to discuss.

## 2023-07-14 NOTE — TELEPHONE ENCOUNTER
Pt states she spoke with billing dept, and was told she would have to pay out of pocket in the state of [place] if she continues her care with us. Pt wanted to know if Dr. Vanegas travels to [place]; informed pt she does not, she would have to find an office in her area. Pt asked does Dr. Vanegas recommend a physician in [place]. Informed pt I would send message to Romina to see what she says; pt voiced understanding.

## 2023-07-14 NOTE — TELEPHONE ENCOUNTER
----- Message from Ashly Clancy sent at 7/14/2023  2:30 PM CDT -----  Contact: Patient  Type:  Sooner Appointment Request    Caller is requesting a sooner appointment.  Caller declined first available appointment listed below.  Caller will not accept being placed on the waitlist and is requesting a message be sent to doctor.    Name of Caller:  Patient  When is the first available appointment?  na  Symptoms:  She needs a an apt to have her surgery as she was seeing Dr Vanegas but her insurance won;t cover her surgery and this provider.  Best Call Back Number:  [phone]  Additional Information:  Please call the pt back to advise. Thanks!

## 2023-07-18 LAB
FINAL PATHOLOGIC DIAGNOSIS: ABNORMAL
Lab: ABNORMAL

## 2023-07-25 ENCOUNTER — TELEPHONE (OUTPATIENT)
Dept: OBSTETRICS AND GYNECOLOGY | Facility: CLINIC | Age: 48
End: 2023-07-25
Payer: COMMERCIAL

## 2023-07-25 NOTE — TELEPHONE ENCOUNTER
Called Boo to make sure she was aware of her latest (inadequate) test results and had a plan for follow-up.  She has established care with a gynecologist in [place].  She has had her PAP repeated and is scheduled for surgery.  She had no questions for me and appreciated the follow-up.    MD MIYA

## 2023-10-09 PROBLEM — N12 PYELONEPHRITIS: Status: RESOLVED | Noted: 2023-07-06 | Resolved: 2023-10-09

## 2024-09-19 NOTE — ED PROVIDER NOTES
9/19/2024 1:43PM Stacey Santa  Patient confirmed scheduled appointment:  Date: 2/3/2025  Time: 12:30PM  Visit type: Return Critical Care  Provider: [facility]  [place] [address] [place] [address]  Testing/imaging: Labs (1st Floor Imaging) prior at 11:30AM. Pt requesting echo and CT prior- sent message to Fwd: Power.  Additional notes: 9/19 Scheduled Return Critical Care w/ labs prior 2/3/2025. Pt requesting echo and CT prior. Sent message to Fwd: Power. JACINTO Vasqueshan Kiet 9/19/2024 1:43PM    Encounter Date: 7/6/2023       History     Chief Complaint   Patient presents with    Abdominal Pain     Lower abdominal pain and pressure, doesn't burn when she urinated but has pain after and back pain      48-year-old female with past medical history of diabetes, hypertension, presents emergency department with lower abdominal pain and pressure.  Patient has had issues with lower abdominal pain pressure off and on for the last month or so.  She has been on several rounds of antibiotics.  She says she has been having low-grade fever and has had vomiting all last week.  She says that her doctor did ultrasound and then did CT scan on her with some concerning findings on the CT scan which was performed on July 3rd, 3 days ago.  She says that she was just switched to a new antibiotic, ciprofloxacin which she has taken 4 doses of which seems to be helping with the fever and the nausea and vomiting.  She says that she is having suprapubic pain pressure aching.  She says that she is not having any dysuria or frequency.  She says that the pain has spread to her back on both sides left greater than right.  Last menstrual cycle was in December, 2022    Review of patient's allergies indicates:  No Known Allergies  Past Medical History:   Diagnosis Date    Diabetes mellitus      History reviewed. No pertinent surgical history.  Family History   Family history unknown: Yes     Social History     Tobacco Use    Smoking status: Never    Smokeless tobacco: Never   Substance Use Topics    Alcohol use: Not Currently    Drug use: Never     Review of Systems   Constitutional:  Positive for fever.   HENT:  Negative for sore throat.    Respiratory:  Negative for shortness of breath.    Cardiovascular:  Negative for chest pain and palpitations.   Gastrointestinal:  Positive for nausea and vomiting.   Genitourinary:  Positive for flank pain and pelvic pain. Negative for dysuria, hematuria, vaginal bleeding and vaginal discharge.    Musculoskeletal:  Negative for back pain.   Skin:  Negative for rash.   Neurological:  Negative for weakness.   Hematological:  Does not bruise/bleed easily.   All other systems reviewed and are negative.    Physical Exam     Initial Vitals [07/06/23 1508]   BP Pulse Resp Temp SpO2   (!) 190/85 85 20 98.3 °F (36.8 °C) 97 %      MAP       --         Physical Exam    Nursing note and vitals reviewed.  Constitutional: She appears well-developed and well-nourished. No distress.   HENT:   Head: Normocephalic and atraumatic.   Mouth/Throat: No oropharyngeal exudate.   Eyes: Conjunctivae and EOM are normal. Pupils are equal, round, and reactive to light.   Neck: Neck supple. No tracheal deviation present.   Normal range of motion.  Cardiovascular:  Normal rate, regular rhythm, normal heart sounds and intact distal pulses.           No murmur heard.  Pulmonary/Chest: Breath sounds normal. No stridor. No respiratory distress. She has no wheezes. She has no rhonchi. She has no rales.   Abdominal: Abdomen is soft. She exhibits no distension. There is abdominal tenderness (suprapubic to palpation).   Mild left greater right CVA tenderness to palpation.  No rash to suggest herpes zoster. There is no rebound and no guarding.   Musculoskeletal:         General: No tenderness or edema. Normal range of motion.      Cervical back: Normal range of motion and neck supple.     Neurological: She is alert and oriented to person, place, and time. She has normal strength. No cranial nerve deficit or sensory deficit.   Skin: Skin is warm and dry. Capillary refill takes less than 2 seconds. No rash noted. No erythema. No pallor.   Psychiatric: She has a normal mood and affect. Her behavior is normal. Judgment and thought content normal.       ED Course   Procedures  Labs Reviewed   COMPREHENSIVE METABOLIC PANEL - Abnormal; Notable for the following components:       Result Value    Sodium 135 (*)     Glucose 264 (*)     All other components  within normal limits   URINALYSIS, REFLEX TO URINE CULTURE - Abnormal; Notable for the following components:    Color, UA Red (*)     Appearance, UA Cloudy (*)     Protein, UA 3+ (*)     Glucose, UA 3+ (*)     Occult Blood UA 3+ (*)     All other components within normal limits    Narrative:     Specimen Source->Urine   URINALYSIS MICROSCOPIC - Abnormal; Notable for the following components:    RBC, UA >100 (*)     All other components within normal limits    Narrative:     Specimen Source->Urine   CULTURE, URINE   CBC W/ AUTO DIFFERENTIAL   LIPASE   LACTIC ACID, PLASMA   POCT GLUCOSE, HAND-HELD DEVICE          Results for orders placed or performed during the hospital encounter of 07/06/23   Blood culture #1 **CANNOT BE ORDERED STAT**    Specimen: Antecubital, Right Arm; Blood   Result Value Ref Range    Blood Culture, Routine No Growth to date    Blood culture #2 **CANNOT BE ORDERED STAT**    Specimen: Antecubital, Left Arm; Blood   Result Value Ref Range    Blood Culture, Routine No Growth to date    CBC W/ AUTO DIFFERENTIAL   Result Value Ref Range    WBC 4.98 3.90 - 12.70 K/uL    RBC 5.36 4.00 - 5.40 M/uL    Hemoglobin 15.5 12.0 - 16.0 g/dL    Hematocrit 46.8 37.0 - 48.5 %    MCV 87 82 - 98 fL    MCH 28.9 27.0 - 31.0 pg    MCHC 33.1 32.0 - 36.0 g/dL    RDW 12.7 11.5 - 14.5 %    Platelets 166 150 - 450 K/uL    MPV 10.4 9.2 - 12.9 fL    Immature Granulocytes 0.4 0.0 - 0.5 %    Gran # (ANC) 2.6 1.8 - 7.7 K/uL    Immature Grans (Abs) 0.02 0.00 - 0.04 K/uL    Lymph # 1.8 1.0 - 4.8 K/uL    Mono # 0.4 0.3 - 1.0 K/uL    Eos # 0.1 0.0 - 0.5 K/uL    Baso # 0.04 0.00 - 0.20 K/uL    nRBC 0 0 /100 WBC    Gran % 51.5 38.0 - 73.0 %    Lymph % 36.7 18.0 - 48.0 %    Mono % 8.0 4.0 - 15.0 %    Eosinophil % 2.6 0.0 - 8.0 %    Basophil % 0.8 0.0 - 1.9 %    Differential Method Automated    Comp. Metabolic Panel   Result Value Ref Range    Sodium 135 (L) 136 - 145 mmol/L    Potassium 4.6 3.5 - 5.1 mmol/L    Chloride 99 95 - 110  mmol/L    CO2 28 23 - 29 mmol/L    Glucose 264 (H) 70 - 110 mg/dL    BUN 17 6 - 20 mg/dL    Creatinine 0.8 0.5 - 1.4 mg/dL    Calcium 9.8 8.7 - 10.5 mg/dL    Total Protein 8.0 6.0 - 8.4 g/dL    Albumin 3.6 3.5 - 5.2 g/dL    Total Bilirubin 0.6 0.1 - 1.0 mg/dL    Alkaline Phosphatase 88 55 - 135 U/L    AST 17 10 - 40 U/L    ALT 26 10 - 44 U/L    eGFR >60 >60 mL/min/1.73 m^2    Anion Gap 8 8 - 16 mmol/L   Lipase   Result Value Ref Range    Lipase 28 4 - 60 U/L   Urinalysis, Reflex to Urine Culture Urine, Clean Catch    Specimen: Urine   Result Value Ref Range    Specimen UA Urine, Clean Catch     Color, UA Red (A) Yellow, Straw, Zamzam    Appearance, UA Cloudy (A) Clear    pH, UA 5.0 5.0 - 8.0    Specific Gravity, UA 1.010 1.005 - 1.030    Protein, UA 3+ (A) Negative    Glucose, UA 3+ (A) Negative    Ketones, UA Negative Negative    Bilirubin (UA) Negative Negative    Occult Blood UA 3+ (A) Negative    Nitrite, UA Negative Negative    Urobilinogen, UA Negative <2.0 EU/dL    Leukocytes, UA Negative Negative   Lactic acid, plasma   Result Value Ref Range    Lactate (Lactic Acid) 1.5 0.5 - 2.2 mmol/L   Urinalysis Microscopic   Result Value Ref Range    RBC, UA >100 (H) 0 - 4 /hpf    WBC, UA 0 0 - 5 /hpf    Bacteria Rare None-Occ /hpf    Yeast, UA None None    Hyaline Casts, UA 0 0-1/lpf /lpf    Epithelial Casts, UA 0 None /lpf    Microscopic Comment SEE COMMENT    Comprehensive Metabolic Panel (CMP)   Result Value Ref Range    Sodium 138 136 - 145 mmol/L    Potassium 4.4 3.5 - 5.1 mmol/L    Chloride 102 95 - 110 mmol/L    CO2 27 23 - 29 mmol/L    Glucose 150 (H) 70 - 110 mg/dL    BUN 14 6 - 20 mg/dL    Creatinine 0.8 0.5 - 1.4 mg/dL    Calcium 8.8 8.7 - 10.5 mg/dL    Total Protein 6.7 6.0 - 8.4 g/dL    Albumin 3.0 (L) 3.5 - 5.2 g/dL    Total Bilirubin 0.4 0.1 - 1.0 mg/dL    Alkaline Phosphatase 74 55 - 135 U/L    AST 15 10 - 40 U/L    ALT 18 10 - 44 U/L    eGFR >60 >60 mL/min/1.73 m^2    Anion Gap 9 8 - 16 mmol/L    Magnesium   Result Value Ref Range    Magnesium 1.7 1.6 - 2.6 mg/dL   Phosphorus   Result Value Ref Range    Phosphorus 4.3 2.7 - 4.5 mg/dL   CBC with Automated Differential   Result Value Ref Range    WBC 5.97 3.90 - 12.70 K/uL    RBC 4.92 4.00 - 5.40 M/uL    Hemoglobin 14.0 12.0 - 16.0 g/dL    Hematocrit 43.0 37.0 - 48.5 %    MCV 87 82 - 98 fL    MCH 28.5 27.0 - 31.0 pg    MCHC 32.6 32.0 - 36.0 g/dL    RDW 12.8 11.5 - 14.5 %    Platelets 145 (L) 150 - 450 K/uL    MPV 10.1 9.2 - 12.9 fL    Immature Granulocytes 0.7 (H) 0.0 - 0.5 %    Gran # (ANC) 2.6 1.8 - 7.7 K/uL    Immature Grans (Abs) 0.04 0.00 - 0.04 K/uL    Lymph # 2.6 1.0 - 4.8 K/uL    Mono # 0.5 0.3 - 1.0 K/uL    Eos # 0.2 0.0 - 0.5 K/uL    Baso # 0.04 0.00 - 0.20 K/uL    nRBC 0 0 /100 WBC    Gran % 44.0 38.0 - 73.0 %    Lymph % 43.9 18.0 - 48.0 %    Mono % 7.9 4.0 - 15.0 %    Eosinophil % 2.8 0.0 - 8.0 %    Basophil % 0.7 0.0 - 1.9 %    Differential Method Automated    POCT glucose   Result Value Ref Range    POCT Glucose 76 70 - 110 mg/dL     No orders to display       Imaging Results    None          Medications   sodium chloride 0.9% flush 10 mL (has no administration in time range)   albuterol-ipratropium 2.5 mg-0.5 mg/3 mL nebulizer solution 3 mL (has no administration in time range)   melatonin tablet 9 mg (has no administration in time range)   ondansetron injection 4 mg (has no administration in time range)   simethicone chewable tablet 80 mg (has no administration in time range)   aluminum-magnesium hydroxide-simethicone 200-200-20 mg/5 mL suspension 30 mL (has no administration in time range)   acetaminophen tablet 650 mg (650 mg Oral Given 7/7/23 0030)   naloxone 0.4 mg/mL injection 0.02 mg (has no administration in time range)   potassium bicarbonate disintegrating tablet 50 mEq (has no administration in time range)   potassium bicarbonate disintegrating tablet 35 mEq (has no administration in time range)   potassium bicarbonate  disintegrating tablet 60 mEq (has no administration in time range)   magnesium oxide tablet 800 mg (has no administration in time range)   magnesium oxide tablet 800 mg (has no administration in time range)   potassium, sodium phosphates 280-160-250 mg packet 2 packet (has no administration in time range)   potassium, sodium phosphates 280-160-250 mg packet 2 packet (has no administration in time range)   potassium, sodium phosphates 280-160-250 mg packet 2 packet (has no administration in time range)   insulin aspart U-100 pen 1-10 Units (has no administration in time range)   glucose chewable tablet 16 g (has no administration in time range)   glucose chewable tablet 24 g (has no administration in time range)   glucagon (human recombinant) injection 1 mg (has no administration in time range)   0.9%  NaCl infusion ( Intravenous Verify Only 7/7/23 0513)   morphine injection 4 mg (4 mg Intravenous Given 7/7/23 0242)   acetaminophen tablet 650 mg (has no administration in time range)   cefTRIAXone (ROCEPHIN) 1 g in dextrose 5 % in water (D5W) 5 % 100 mL IVPB (MB+) (0 g Intravenous Stopped 7/7/23 0646)   dextrose 10% bolus 125 mL 125 mL (has no administration in time range)   dextrose 10% bolus 250 mL 250 mL (has no administration in time range)   morphine injection 4 mg (4 mg Intravenous Given 7/6/23 1651)   ondansetron injection 4 mg (4 mg Intravenous Given 7/6/23 1651)   lactated ringers bolus 1,000 mL (0 mLs Intravenous Stopped 7/6/23 1752)   cefTRIAXone (ROCEPHIN) 1 g in dextrose 5 % in water (D5W) 5 % 100 mL IVPB (MB+) (0 g Intravenous Stopped 7/6/23 1839)     Medical Decision Making:   Differential Diagnosis:   Includes but not limited to pyelonephritis, uterine fibroid, vaginal bleeding, uterine cancer, ovarian cancer, uncontrolled diabetes  Clinical Tests:   Lab Tests: Ordered and Reviewed  Radiological Study: Ordered and Reviewed  Medical Tests: Ordered and Reviewed  ED Management:  Emergent evaluation of a  48-year-old female presents emergency department with lower pelvic pain and back pain.  Overall impression is acute pyelonephritis likely failing outpatient therapy.  Patient has had multiple rounds of antibiotics and just started on Cipro.  Just now getting control of her fever but still having increased pain.  I ordered Rocephin here in the emergency department.  No obvious urine culture has been found.  Plan to treat with pain medication IV morphine IV Zofran IV antibiotics she was administered IV Rocephin in the emergency department and will be admitted for pain control and further evaluation of symptoms.  Patient also had episode of vaginal bleeding in the ER.  I discussed with the OBGYN has seen below in the ED course.  Patient will be evaluated on inpatient basis by OBGYN.  Patient will be admitted    A dictation software program was used for this note.  Please expect some simple typographical  errors in this note.                ED Course as of 07/07/23 0857   Thu Jul 06, 2023 1810 Case discussed with hospitalist will admit the patient to the hospital. [JR]   1810 I discussed the case with OBGYN Dr. Coombs who stated that his partner Dr. Savanna carbajal would be able to help with this patient's care [JR]      ED Course User Index  [JR] Arcadio Malik DO                 Clinical Impression:   Final diagnoses:  [N10] Acute pyelonephritis (Primary)  [D25.9] Uterine leiomyoma, unspecified location  [R10.2] Pelvic pain        ED Disposition Condition    Admit Stable                Arcadio Malik DO  07/07/23 0857